# Patient Record
Sex: FEMALE | Race: WHITE | Employment: FULL TIME | URBAN - METROPOLITAN AREA
[De-identification: names, ages, dates, MRNs, and addresses within clinical notes are randomized per-mention and may not be internally consistent; named-entity substitution may affect disease eponyms.]

---

## 2022-11-09 ENCOUNTER — OFFICE VISIT (OUTPATIENT)
Dept: FAMILY MEDICINE CLINIC | Facility: CLINIC | Age: 27
End: 2022-11-09
Payer: COMMERCIAL

## 2022-11-09 ENCOUNTER — LAB ENCOUNTER (OUTPATIENT)
Dept: LAB | Age: 27
End: 2022-11-09
Attending: PHYSICIAN ASSISTANT
Payer: COMMERCIAL

## 2022-11-09 VITALS
DIASTOLIC BLOOD PRESSURE: 68 MMHG | WEIGHT: 127 LBS | SYSTOLIC BLOOD PRESSURE: 110 MMHG | BODY MASS INDEX: 19.93 KG/M2 | HEART RATE: 71 BPM | OXYGEN SATURATION: 98 % | RESPIRATION RATE: 16 BRPM | HEIGHT: 67 IN

## 2022-11-09 DIAGNOSIS — F41.9 ANXIETY AND DEPRESSION: ICD-10-CM

## 2022-11-09 DIAGNOSIS — L40.9 PSORIASIS: ICD-10-CM

## 2022-11-09 DIAGNOSIS — F32.A ANXIETY AND DEPRESSION: ICD-10-CM

## 2022-11-09 DIAGNOSIS — J45.30 MILD PERSISTENT ASTHMA WITHOUT COMPLICATION: ICD-10-CM

## 2022-11-09 DIAGNOSIS — K64.4 EXTERNAL HEMORRHOID: ICD-10-CM

## 2022-11-09 DIAGNOSIS — Z12.4 CERVICAL CANCER SCREENING: ICD-10-CM

## 2022-11-09 DIAGNOSIS — Z00.00 ROUTINE GENERAL MEDICAL EXAMINATION AT A HEALTH CARE FACILITY: ICD-10-CM

## 2022-11-09 DIAGNOSIS — Z00.00 ROUTINE GENERAL MEDICAL EXAMINATION AT A HEALTH CARE FACILITY: Primary | ICD-10-CM

## 2022-11-09 PROBLEM — Z97.5 IUD (INTRAUTERINE DEVICE) IN PLACE: Status: ACTIVE | Noted: 2022-11-09

## 2022-11-09 LAB
ALBUMIN SERPL-MCNC: 4.4 G/DL (ref 3.4–5)
ALBUMIN/GLOB SERPL: 1.3 {RATIO} (ref 1–2)
ALP LIVER SERPL-CCNC: 67 U/L
ALT SERPL-CCNC: 18 U/L
ANION GAP SERPL CALC-SCNC: 4 MMOL/L (ref 0–18)
AST SERPL-CCNC: 14 U/L (ref 15–37)
BASOPHILS # BLD AUTO: 0.06 X10(3) UL (ref 0–0.2)
BASOPHILS NFR BLD AUTO: 1.2 %
BILIRUB SERPL-MCNC: 0.5 MG/DL (ref 0.1–2)
BUN BLD-MCNC: 9 MG/DL (ref 7–18)
CALCIUM BLD-MCNC: 9.3 MG/DL (ref 8.5–10.1)
CHLORIDE SERPL-SCNC: 104 MMOL/L (ref 98–112)
CHOLEST SERPL-MCNC: 148 MG/DL (ref ?–200)
CO2 SERPL-SCNC: 29 MMOL/L (ref 21–32)
CREAT BLD-MCNC: 0.69 MG/DL
EOSINOPHIL # BLD AUTO: 0.06 X10(3) UL (ref 0–0.7)
EOSINOPHIL NFR BLD AUTO: 1.2 %
ERYTHROCYTE [DISTWIDTH] IN BLOOD BY AUTOMATED COUNT: 11.4 %
FASTING PATIENT LIPID ANSWER: NO
FASTING STATUS PATIENT QL REPORTED: NO
GFR SERPLBLD BASED ON 1.73 SQ M-ARVRAT: 122 ML/MIN/1.73M2 (ref 60–?)
GLOBULIN PLAS-MCNC: 3.3 G/DL (ref 2.8–4.4)
GLUCOSE BLD-MCNC: 84 MG/DL (ref 70–99)
HCT VFR BLD AUTO: 40.8 %
HDLC SERPL-MCNC: 57 MG/DL (ref 40–59)
HGB BLD-MCNC: 13.9 G/DL
IMM GRANULOCYTES # BLD AUTO: 0.01 X10(3) UL (ref 0–1)
IMM GRANULOCYTES NFR BLD: 0.2 %
LDLC SERPL CALC-MCNC: 73 MG/DL (ref ?–100)
LYMPHOCYTES # BLD AUTO: 1.85 X10(3) UL (ref 1–4)
LYMPHOCYTES NFR BLD AUTO: 38.3 %
MCH RBC QN AUTO: 31.9 PG (ref 26–34)
MCHC RBC AUTO-ENTMCNC: 34.1 G/DL (ref 31–37)
MCV RBC AUTO: 93.6 FL
MONOCYTES # BLD AUTO: 0.38 X10(3) UL (ref 0.1–1)
MONOCYTES NFR BLD AUTO: 7.9 %
NEUTROPHILS # BLD AUTO: 2.47 X10 (3) UL (ref 1.5–7.7)
NEUTROPHILS # BLD AUTO: 2.47 X10(3) UL (ref 1.5–7.7)
NEUTROPHILS NFR BLD AUTO: 51.2 %
NONHDLC SERPL-MCNC: 91 MG/DL (ref ?–130)
OSMOLALITY SERPL CALC.SUM OF ELEC: 282 MOSM/KG (ref 275–295)
PLATELET # BLD AUTO: 325 10(3)UL (ref 150–450)
POTASSIUM SERPL-SCNC: 4.2 MMOL/L (ref 3.5–5.1)
PROT SERPL-MCNC: 7.7 G/DL (ref 6.4–8.2)
RBC # BLD AUTO: 4.36 X10(6)UL
SODIUM SERPL-SCNC: 137 MMOL/L (ref 136–145)
TRIGL SERPL-MCNC: 101 MG/DL (ref 30–149)
TSI SER-ACNC: 1.65 MIU/ML (ref 0.36–3.74)
VIT B12 SERPL-MCNC: 438 PG/ML (ref 193–986)
VIT D+METAB SERPL-MCNC: 23.6 NG/ML (ref 30–100)
VLDLC SERPL CALC-MCNC: 15 MG/DL (ref 0–30)
WBC # BLD AUTO: 4.8 X10(3) UL (ref 4–11)

## 2022-11-09 PROCEDURE — 80053 COMPREHEN METABOLIC PANEL: CPT

## 2022-11-09 PROCEDURE — 3008F BODY MASS INDEX DOCD: CPT | Performed by: PHYSICIAN ASSISTANT

## 2022-11-09 PROCEDURE — G0438 PPPS, INITIAL VISIT: HCPCS | Performed by: PHYSICIAN ASSISTANT

## 2022-11-09 PROCEDURE — 82607 VITAMIN B-12: CPT

## 2022-11-09 PROCEDURE — 82306 VITAMIN D 25 HYDROXY: CPT

## 2022-11-09 PROCEDURE — 99385 PREV VISIT NEW AGE 18-39: CPT | Performed by: PHYSICIAN ASSISTANT

## 2022-11-09 PROCEDURE — 3078F DIAST BP <80 MM HG: CPT | Performed by: PHYSICIAN ASSISTANT

## 2022-11-09 PROCEDURE — 3074F SYST BP LT 130 MM HG: CPT | Performed by: PHYSICIAN ASSISTANT

## 2022-11-09 PROCEDURE — 80061 LIPID PANEL: CPT

## 2022-11-09 PROCEDURE — 85025 COMPLETE CBC W/AUTO DIFF WBC: CPT

## 2022-11-09 PROCEDURE — 84443 ASSAY THYROID STIM HORMONE: CPT

## 2022-11-09 PROCEDURE — 36415 COLL VENOUS BLD VENIPUNCTURE: CPT

## 2022-11-09 RX ORDER — FLUTICASONE PROPIONATE 44 UG/1
AEROSOL, METERED RESPIRATORY (INHALATION)
COMMUNITY
Start: 2022-09-06

## 2023-02-13 ENCOUNTER — OFFICE VISIT (OUTPATIENT)
Dept: FAMILY MEDICINE CLINIC | Facility: CLINIC | Age: 28
End: 2023-02-13
Payer: COMMERCIAL

## 2023-02-13 VITALS
SYSTOLIC BLOOD PRESSURE: 112 MMHG | HEIGHT: 67 IN | DIASTOLIC BLOOD PRESSURE: 80 MMHG | BODY MASS INDEX: 20.4 KG/M2 | RESPIRATION RATE: 16 BRPM | WEIGHT: 130 LBS

## 2023-02-13 DIAGNOSIS — M62.838 MUSCLE SPASM: Primary | ICD-10-CM

## 2023-02-13 RX ORDER — METHOCARBAMOL 750 MG/1
750 TABLET, FILM COATED ORAL 3 TIMES DAILY
Qty: 30 TABLET | Refills: 0 | Status: SHIPPED | OUTPATIENT
Start: 2023-02-13

## 2023-04-05 ENCOUNTER — TELEPHONE (OUTPATIENT)
Dept: FAMILY MEDICINE CLINIC | Facility: CLINIC | Age: 28
End: 2023-04-05

## 2023-04-05 DIAGNOSIS — M62.838 MUSCLE SPASM: Primary | ICD-10-CM

## 2023-04-05 NOTE — TELEPHONE ENCOUNTER
Dr. Marlena Smith note put I Dr. Hidalgo Foots office for review, ok for continuation of chiro therapy?

## 2023-04-26 ENCOUNTER — PATIENT MESSAGE (OUTPATIENT)
Dept: FAMILY MEDICINE CLINIC | Facility: CLINIC | Age: 28
End: 2023-04-26

## 2023-04-26 DIAGNOSIS — M62.838 MUSCLE SPASM: Primary | ICD-10-CM

## 2023-04-26 NOTE — TELEPHONE ENCOUNTER
From: Ravindra Naik  To: Zaida Ronquillo PA-C  Sent: 4/26/2023 6:33 AM CDT  Subject: Insurance Change    I was referred to a chiropractor, which has been so helpful for my shoulder. My insurance is changing though and wanted to send this information over. My updated insurance is through [employer], member ID: [id number] and group number: [id number] through [employer]. Feel free to call me at [phone]. Thank you! no

## 2023-06-27 ENCOUNTER — PATIENT MESSAGE (OUTPATIENT)
Dept: FAMILY MEDICINE CLINIC | Facility: CLINIC | Age: 28
End: 2023-06-27

## 2023-06-28 RX ORDER — FLUTICASONE PROPIONATE 44 UG/1
2 AEROSOL, METERED RESPIRATORY (INHALATION) 2 TIMES DAILY
Qty: 10.6 G | Refills: 2 | Status: SHIPPED | OUTPATIENT
Start: 2023-06-28

## 2023-09-07 RX ORDER — FLUTICASONE PROPIONATE 44 UG/1
2 AEROSOL, METERED RESPIRATORY (INHALATION) 2 TIMES DAILY
Qty: 3 EACH | Refills: 0 | Status: SHIPPED | OUTPATIENT
Start: 2023-09-07

## 2023-09-25 NOTE — TELEPHONE ENCOUNTER
A refill request was received for:  Requested Prescriptions     Pending Prescriptions Disp Refills    SERTRALINE 50 MG Oral Tab [Pharmacy Med Name: SERTRALINE 50MG TABLETS] 90 tablet 0     Sig: TAKE 1 TABLET(50 MG) BY MOUTH DAILY       Last refill date: 6/28/2023      Last office visit: 11/9/2022    Follow up due:  No future appointments.

## 2023-10-18 RX ORDER — FLUTICASONE PROPIONATE 44 UG/1
2 AEROSOL, METERED RESPIRATORY (INHALATION) 2 TIMES DAILY
Qty: 3 EACH | Refills: 0 | Status: SHIPPED | OUTPATIENT
Start: 2023-10-18

## 2023-11-02 RX ORDER — FLUTICASONE PROPIONATE 44 UG/1
2 AEROSOL, METERED RESPIRATORY (INHALATION) 2 TIMES DAILY
Qty: 10.6 G | Refills: 0 | Status: SHIPPED | OUTPATIENT
Start: 2023-11-02

## 2023-11-02 NOTE — TELEPHONE ENCOUNTER
A refill request was received for:  Requested Prescriptions     Pending Prescriptions Disp Refills    FLOVENT HFA 44 MCG/ACT Inhalation Aerosol [Pharmacy Med Name: [address] [place] [address] HFA 44MCG ORAL INH 120INH] 10.6 g 0     Sig: INHALE 2 PUFFS INTO THE LUNGS TWICE DAILY     My chart sent-due for annual  Last refill date: 10/18/2023      Last office visit: 11/9/2023    Follow up due:  No future appointments.

## 2023-12-04 ENCOUNTER — LAB ENCOUNTER (OUTPATIENT)
Dept: LAB | Age: 28
End: 2023-12-04
Attending: PHYSICIAN ASSISTANT
Payer: COMMERCIAL

## 2023-12-04 ENCOUNTER — OFFICE VISIT (OUTPATIENT)
Dept: FAMILY MEDICINE CLINIC | Facility: CLINIC | Age: 28
End: 2023-12-04
Payer: COMMERCIAL

## 2023-12-04 VITALS
SYSTOLIC BLOOD PRESSURE: 116 MMHG | HEIGHT: 67 IN | DIASTOLIC BLOOD PRESSURE: 78 MMHG | WEIGHT: 130 LBS | BODY MASS INDEX: 20.4 KG/M2 | HEART RATE: 65 BPM | OXYGEN SATURATION: 98 % | RESPIRATION RATE: 16 BRPM

## 2023-12-04 DIAGNOSIS — Z00.00 ROUTINE GENERAL MEDICAL EXAMINATION AT A HEALTH CARE FACILITY: ICD-10-CM

## 2023-12-04 DIAGNOSIS — Z00.00 ROUTINE GENERAL MEDICAL EXAMINATION AT A HEALTH CARE FACILITY: Primary | ICD-10-CM

## 2023-12-04 DIAGNOSIS — E55.9 VITAMIN D DEFICIENCY: ICD-10-CM

## 2023-12-04 DIAGNOSIS — M62.838 MUSCLE SPASM: ICD-10-CM

## 2023-12-04 DIAGNOSIS — F32.A ANXIETY AND DEPRESSION: ICD-10-CM

## 2023-12-04 DIAGNOSIS — Z30.431 IUD SURVEILLANCE: ICD-10-CM

## 2023-12-04 DIAGNOSIS — F41.9 ANXIETY AND DEPRESSION: ICD-10-CM

## 2023-12-04 DIAGNOSIS — J45.30 MILD PERSISTENT ASTHMA WITHOUT COMPLICATION: ICD-10-CM

## 2023-12-04 LAB
ALBUMIN SERPL-MCNC: 4.3 G/DL (ref 3.4–5)
ALBUMIN/GLOB SERPL: 1.2 {RATIO} (ref 1–2)
ALP LIVER SERPL-CCNC: 66 U/L
ALT SERPL-CCNC: 21 U/L
ANION GAP SERPL CALC-SCNC: 6 MMOL/L (ref 0–18)
AST SERPL-CCNC: 15 U/L (ref 15–37)
BASOPHILS # BLD AUTO: 0.03 X10(3) UL (ref 0–0.2)
BASOPHILS NFR BLD AUTO: 0.6 %
BILIRUB SERPL-MCNC: 0.5 MG/DL (ref 0.1–2)
BUN BLD-MCNC: 10 MG/DL (ref 9–23)
CALCIUM BLD-MCNC: 9.2 MG/DL (ref 8.5–10.1)
CHLORIDE SERPL-SCNC: 107 MMOL/L (ref 98–112)
CHOLEST SERPL-MCNC: 127 MG/DL (ref ?–200)
CO2 SERPL-SCNC: 27 MMOL/L (ref 21–32)
CREAT BLD-MCNC: 0.78 MG/DL
EGFRCR SERPLBLD CKD-EPI 2021: 106 ML/MIN/1.73M2 (ref 60–?)
EOSINOPHIL # BLD AUTO: 0.06 X10(3) UL (ref 0–0.7)
EOSINOPHIL NFR BLD AUTO: 1.1 %
ERYTHROCYTE [DISTWIDTH] IN BLOOD BY AUTOMATED COUNT: 11.7 %
FASTING PATIENT LIPID ANSWER: NO
FASTING STATUS PATIENT QL REPORTED: NO
GLOBULIN PLAS-MCNC: 3.6 G/DL (ref 2.8–4.4)
GLUCOSE BLD-MCNC: 94 MG/DL (ref 70–99)
HCT VFR BLD AUTO: 40.6 %
HDLC SERPL-MCNC: 54 MG/DL (ref 40–59)
HGB BLD-MCNC: 13.6 G/DL
IMM GRANULOCYTES # BLD AUTO: 0.02 X10(3) UL (ref 0–1)
IMM GRANULOCYTES NFR BLD: 0.4 %
LDLC SERPL CALC-MCNC: 58 MG/DL (ref ?–100)
LYMPHOCYTES # BLD AUTO: 1.51 X10(3) UL (ref 1–4)
LYMPHOCYTES NFR BLD AUTO: 28.5 %
MCH RBC QN AUTO: 31.3 PG (ref 26–34)
MCHC RBC AUTO-ENTMCNC: 33.5 G/DL (ref 31–37)
MCV RBC AUTO: 93.5 FL
MONOCYTES # BLD AUTO: 0.48 X10(3) UL (ref 0.1–1)
MONOCYTES NFR BLD AUTO: 9.1 %
NEUTROPHILS # BLD AUTO: 3.2 X10 (3) UL (ref 1.5–7.7)
NEUTROPHILS # BLD AUTO: 3.2 X10(3) UL (ref 1.5–7.7)
NEUTROPHILS NFR BLD AUTO: 60.3 %
NONHDLC SERPL-MCNC: 73 MG/DL (ref ?–130)
OSMOLALITY SERPL CALC.SUM OF ELEC: 289 MOSM/KG (ref 275–295)
PLATELET # BLD AUTO: 315 10(3)UL (ref 150–450)
POTASSIUM SERPL-SCNC: 4 MMOL/L (ref 3.5–5.1)
PROT SERPL-MCNC: 7.9 G/DL (ref 6.4–8.2)
RBC # BLD AUTO: 4.34 X10(6)UL
SODIUM SERPL-SCNC: 140 MMOL/L (ref 136–145)
TRIGL SERPL-MCNC: 77 MG/DL (ref 30–149)
TSI SER-ACNC: 1.44 MIU/ML (ref 0.36–3.74)
VIT D+METAB SERPL-MCNC: 37 NG/ML (ref 30–100)
VLDLC SERPL CALC-MCNC: 11 MG/DL (ref 0–30)
WBC # BLD AUTO: 5.3 X10(3) UL (ref 4–11)

## 2023-12-04 PROCEDURE — 82306 VITAMIN D 25 HYDROXY: CPT

## 2023-12-04 PROCEDURE — 3008F BODY MASS INDEX DOCD: CPT | Performed by: PHYSICIAN ASSISTANT

## 2023-12-04 PROCEDURE — 36415 COLL VENOUS BLD VENIPUNCTURE: CPT

## 2023-12-04 PROCEDURE — 3074F SYST BP LT 130 MM HG: CPT | Performed by: PHYSICIAN ASSISTANT

## 2023-12-04 PROCEDURE — 3078F DIAST BP <80 MM HG: CPT | Performed by: PHYSICIAN ASSISTANT

## 2023-12-04 PROCEDURE — G0438 PPPS, INITIAL VISIT: HCPCS | Performed by: PHYSICIAN ASSISTANT

## 2023-12-04 PROCEDURE — 99395 PREV VISIT EST AGE 18-39: CPT | Performed by: PHYSICIAN ASSISTANT

## 2023-12-04 PROCEDURE — 80061 LIPID PANEL: CPT

## 2023-12-04 PROCEDURE — 80053 COMPREHEN METABOLIC PANEL: CPT

## 2023-12-04 PROCEDURE — 85025 COMPLETE CBC W/AUTO DIFF WBC: CPT

## 2023-12-04 PROCEDURE — 84443 ASSAY THYROID STIM HORMONE: CPT

## 2023-12-04 RX ORDER — ALBUTEROL SULFATE 90 UG/1
2 AEROSOL, METERED RESPIRATORY (INHALATION) EVERY 6 HOURS PRN
Qty: 1 EACH | Refills: 5 | Status: SHIPPED | OUTPATIENT
Start: 2023-12-04

## 2023-12-04 RX ORDER — FLUTICASONE PROPIONATE 44 UG/1
2 AEROSOL, METERED RESPIRATORY (INHALATION) 2 TIMES DAILY
Qty: 3 EACH | Refills: 3 | Status: SHIPPED | OUTPATIENT
Start: 2023-12-04

## 2023-12-30 DIAGNOSIS — F41.9 ANXIETY AND DEPRESSION: ICD-10-CM

## 2023-12-30 DIAGNOSIS — F32.A ANXIETY AND DEPRESSION: ICD-10-CM

## 2024-01-02 NOTE — TELEPHONE ENCOUNTER
A refill request was received for:  Requested Prescriptions     Pending Prescriptions Disp Refills    SERTRALINE 50 MG Oral Tab [Pharmacy Med Name: SERTRALINE 50MG TABLETS] 90 tablet 3     Sig: TAKE 1 TABLET(50 MG) BY MOUTH DAILY       Last refill date:12/4/2023       Last office visit: 12/4/2023    Follow up due:  Future Appointments   Date Time Provider Department Center   1/12/2024 10:30 AM [id number]

## 2024-01-12 ENCOUNTER — PATIENT MESSAGE (OUTPATIENT)
Dept: FAMILY MEDICINE CLINIC | Facility: CLINIC | Age: 29
End: 2024-01-12

## 2024-01-12 NOTE — TELEPHONE ENCOUNTER
From: Shea Oro  To: Cinda Velásquez  Sent: 1/12/2024 9:16 AM CST  Subject: IUD removal    I made an appointment with Dr Vincent at [facility] this morning through my chart for an iud removal on 1/16 and wanted to ensure that it is covered by insurance. Thank you!

## 2024-01-16 DIAGNOSIS — J45.30 MILD PERSISTENT ASTHMA WITHOUT COMPLICATION: ICD-10-CM

## 2024-01-17 RX ORDER — FLUTICASONE PROPIONATE 44 UG/1
2 AEROSOL, METERED RESPIRATORY (INHALATION) 2 TIMES DAILY
Qty: 3 EACH | Refills: 3 | Status: SHIPPED | OUTPATIENT
Start: 2024-01-17 | End: 2024-01-17

## 2024-01-26 ENCOUNTER — OFFICE VISIT (OUTPATIENT)
Dept: OBGYN CLINIC | Facility: CLINIC | Age: 29
End: 2024-01-26
Payer: COMMERCIAL

## 2024-01-26 VITALS
HEART RATE: 61 BPM | DIASTOLIC BLOOD PRESSURE: 84 MMHG | HEIGHT: 65 IN | BODY MASS INDEX: 21.31 KG/M2 | SYSTOLIC BLOOD PRESSURE: 126 MMHG | WEIGHT: 127.88 LBS

## 2024-01-26 DIAGNOSIS — Z53.8 ATTEMPTED IUD REMOVAL, UNSUCCESSFUL: Primary | ICD-10-CM

## 2024-01-26 DIAGNOSIS — Z97.5 ATTEMPTED IUD REMOVAL, UNSUCCESSFUL: Primary | ICD-10-CM

## 2024-01-26 PROCEDURE — 3074F SYST BP LT 130 MM HG: CPT

## 2024-01-26 PROCEDURE — 99212 OFFICE O/P EST SF 10 MIN: CPT

## 2024-01-26 PROCEDURE — 3008F BODY MASS INDEX DOCD: CPT

## 2024-01-26 PROCEDURE — 3079F DIAST BP 80-89 MM HG: CPT

## 2024-01-26 NOTE — PROGRESS NOTES
Shea Oro is a 28 year old female  No LMP recorded (lmp unknown). (Menstrual status: IUD - Intrauterine Device).   Chief Complaint   Patient presents with    IUD     IUD removal     New Patient   .  She is planning to conceive and would like the Mirena removed.     OBSTETRICS HISTORY:  OB History    Para Term  AB Living   0 0 0 0 0 0   SAB IAB Ectopic Multiple Live Births   0 0 0 0 0       GYNE HISTORY:  Periods  none due to IUD      History   Sexual Activity    Sexual activity: Not on file        Hx Prior Abnormal Pap: No  Pap Date: 22  Pap Result Notes: negative        MEDICAL HISTORY:      SURGICAL HISTORY:  History reviewed. No pertinent surgical history.    SOCIAL HISTORY:  Social History     Socioeconomic History    Marital status:      Spouse name: Not on file    Number of children: Not on file    Years of education: Not on file    Highest education level: Not on file   Occupational History    Not on file   Tobacco Use    Smoking status: Never    Smokeless tobacco: Never   Vaping Use    Vaping Use: Never used   Substance and Sexual Activity    Alcohol use: Yes     Comment: 1 every 2 weeks    Drug use: Never    Sexual activity: Not on file   Other Topics Concern    Not on file   Social History Narrative    Not on file     Social Determinants of Health     Financial Resource Strain: Not on file   Food Insecurity: Not on file   Transportation Needs: Not on file   Physical Activity: Not on file   Stress: Not on file   Social Connections: Not on file   Housing Stability: Not on file       FAMILY HISTORY:  History reviewed. No pertinent family history.    MEDICATIONS:    Current Outpatient Medications:     Beclomethasone Diprop HFA (QVAR REDIHALER) 40 MCG/ACT Inhalation Aerosol, Breath Activated, Inhale 1-2 puffs into the lungs in the morning and 1-2 puffs before bedtime., Disp: 3 each, Rfl: 3    sertraline 50 MG Oral Tab, Take 1 tablet (50 mg total) by mouth daily., Disp:  90 tablet, Rfl: 3    albuterol 108 (90 Base) MCG/ACT Inhalation Aero Soln, Inhale 2 puffs into the lungs every 6 (six) hours as needed for Wheezing., Disp: 1 each, Rfl: 5    ALLERGIES:  No Known Allergies      PHYSICAL EXAM:   Pelvic Exam:  External Genitalia: normal appearance, hair distribution, and no lesions  Urethral Meatus:  normal in size, location, without lesions and prolapse  Bladder:  No fullness, masses or tenderness  Vagina:  Normal appearance without lesions, no abnormal discharge  Cervix:  Normal without tenderness on motion, IUD string seen, very short, 1 mm from cervix   Uterus: normal in size, contour, position, mobility, without tenderness  Adnexa: normal without masses or tenderness  Perineum: normal  Anus: no hemorroids     Assessment & Plan:  1. Attempted IUD removal, unsuccessful  -tried to grasp strings with forceps, unable to due to IUD strings being very short.  -Advise her to follow up with OBs.

## 2024-02-15 ENCOUNTER — HOSPITAL ENCOUNTER (OUTPATIENT)
Dept: ULTRASOUND IMAGING | Facility: HOSPITAL | Age: 29
Discharge: HOME | End: 2024-02-15
Attending: STUDENT IN AN ORGANIZED HEALTH CARE EDUCATION/TRAINING PROGRAM
Payer: COMMERCIAL

## 2024-02-15 ENCOUNTER — OFFICE VISIT (OUTPATIENT)
Dept: OBGYN CLINIC | Facility: CLINIC | Age: 29
End: 2024-02-15
Payer: COMMERCIAL

## 2024-02-15 VITALS
DIASTOLIC BLOOD PRESSURE: 93 MMHG | BODY MASS INDEX: 21.31 KG/M2 | WEIGHT: 127.88 LBS | HEIGHT: 65 IN | HEART RATE: 72 BPM | SYSTOLIC BLOOD PRESSURE: 153 MMHG

## 2024-02-15 DIAGNOSIS — T83.32XA INTRAUTERINE CONTRACEPTIVE DEVICE THREADS LOST, INITIAL ENCOUNTER: ICD-10-CM

## 2024-02-15 DIAGNOSIS — R03.0 ELEVATED BLOOD PRESSURE READING: ICD-10-CM

## 2024-02-15 DIAGNOSIS — T83.32XA INTRAUTERINE CONTRACEPTIVE DEVICE THREADS LOST, INITIAL ENCOUNTER: Primary | ICD-10-CM

## 2024-02-15 PROCEDURE — 3077F SYST BP >= 140 MM HG: CPT | Performed by: STUDENT IN AN ORGANIZED HEALTH CARE EDUCATION/TRAINING PROGRAM

## 2024-02-15 PROCEDURE — 76856 US EXAM PELVIC COMPLETE: CPT | Performed by: STUDENT IN AN ORGANIZED HEALTH CARE EDUCATION/TRAINING PROGRAM

## 2024-02-15 PROCEDURE — 99213 OFFICE O/P EST LOW 20 MIN: CPT | Performed by: STUDENT IN AN ORGANIZED HEALTH CARE EDUCATION/TRAINING PROGRAM

## 2024-02-15 PROCEDURE — 93975 VASCULAR STUDY: CPT | Performed by: STUDENT IN AN ORGANIZED HEALTH CARE EDUCATION/TRAINING PROGRAM

## 2024-02-15 PROCEDURE — 76830 TRANSVAGINAL US NON-OB: CPT | Performed by: STUDENT IN AN ORGANIZED HEALTH CARE EDUCATION/TRAINING PROGRAM

## 2024-02-15 PROCEDURE — 3008F BODY MASS INDEX DOCD: CPT | Performed by: STUDENT IN AN ORGANIZED HEALTH CARE EDUCATION/TRAINING PROGRAM

## 2024-02-15 PROCEDURE — 3080F DIAST BP >= 90 MM HG: CPT | Performed by: STUDENT IN AN ORGANIZED HEALTH CARE EDUCATION/TRAINING PROGRAM

## 2024-02-15 NOTE — PROGRESS NOTES
[facility]  Obstetrics and Gynecology   History & Physical      Chief complaint:   Chief Complaint   Patient presents with    Procedure    IUD     Remove IUD     Consult     Discuss preconception        Subjective:     HPI: Shea Oro is a 28 year old  with No LMP recorded (lmp unknown). (Menstrual status: IUD - Intrauterine Device). presenting for IUD removal.  Uses the following pronouns: she/her.  Her PCP is Rambo Cordova MD.  Partner present for support.    Desires to get pregnant in the next year.  Would like IUD out however unable to reach strings at last visit (\"1 mm from the cervix\").  Reports that they were cut short and no one had been able to see or feel strings since she got it placed.  Ultrasound was needed at that time (7 years ago) to confirm IUD was still there.    OB History  OB History    Para Term  AB Living   0 0 0 0 0 0   SAB IAB Ectopic Multiple Live Births   0 0 0 0 0     OB History    Para Term  AB Living   0 0 0 0 0 0   SAB IAB Ectopic Multiple Live Births   0 0 0 0 0     ROS neg unless otherwise stated in HPI    Depression Scale      PHQ-2 SCORE: 0  , done 2023         Meds:  Current Outpatient Medications on File Prior to Visit   Medication Sig Dispense Refill    Beclomethasone Diprop HFA (QVAR REDIHALER) 40 MCG/ACT Inhalation Aerosol, Breath Activated Inhale 1-2 puffs into the lungs in the morning and 1-2 puffs before bedtime. 3 each 3    sertraline 50 MG Oral Tab Take 1 tablet (50 mg total) by mouth daily. 90 tablet 3    albuterol 108 (90 Base) MCG/ACT Inhalation Aero Soln Inhale 2 puffs into the lungs every 6 (six) hours as needed for Wheezing. 1 each 5     No current facility-administered medications on file prior to visit.       All:  No Known Allergies    PMH:  No past medical history on file.    PSH:  No past surgical history on file.    Social History:  Social History     Socioeconomic History    Marital status:     Tobacco Use    Smoking status: Never    Smokeless tobacco: Never   Vaping Use    Vaping Use: Never used   Substance and Sexual Activity    Alcohol use: Yes     Comment: 1 every 2 weeks    Drug use: Never        Family History:  No family history on file.    Immunization History:  Immunization History   Administered Date(s) Administered    Covid-19 Vaccine Pfizer Bivalent 30mcg/0.3mL 11/03/2022    FLUZONE 6 months and older PFS 0.5 ml (44711) 11/03/2022    Influenza 02/01/2017    Meningococcal-Menactra 07/26/2013    TDAP 06/07/2019    Tb Intradermal Test 10/12/2021, 12/10/2021         Objective:     Vitals:    02/15/24 0940   BP: (!) 153/93   Pulse: 72   Weight: 127 lb 13.9 oz (58 kg)   Height: 65\"       Body mass index is 21.28 kg/m².    Physical Exam:     General: normal appearance  HEENT: normocephalic  Respiratory: normal work of breathing, no extra use of accessory muscles  Cardiac: normal rate  Abdominal: Nontender to palpation, no masses palpated,   MSK: normal range of motion  Neuro: normal movement, normal sensory  Skin: no abnormalities seen    :  - normal appearing external genitalia  - normal appearing vagina, well estrogenized, physiologic discharge  - no strings seen at the cervix  Procedures done: Speculum placed.  Betadine x3.  Strings not seen.  Unable to get strings for IUD removal despite using cytobrush and long kellies after dilating cervix.      Labs:  Lab Results   Component Value Date    WBC 5.3 12/04/2023    RBC 4.34 12/04/2023    HGB 13.6 12/04/2023    HCT 40.6 12/04/2023    MCV 93.5 12/04/2023    MCH 31.3 12/04/2023    MCHC 33.5 12/04/2023    RDW 11.7 12/04/2023    .0 12/04/2023        Lab Results   Component Value Date    GLU 94 12/04/2023    BUN 10 12/04/2023    CREATSERUM 0.78 12/04/2023    ANIONGAP 6 12/04/2023    CA 9.2 12/04/2023    OSMOCALC 289 12/04/2023    ALKPHO 66 12/04/2023    AST 15 12/04/2023    ALT 21 12/04/2023    BILT 0.5 12/04/2023    TP 7.9 12/04/2023    ALB  4.3 2023    GLOBULIN 3.6 2023     2023    K 4.0 2023     2023    CO2 27.0 2023       Lab Results   Component Value Date    CHOLEST 127 2023    TRIG 77 2023    HDL 54 2023    LDL 58 2023    VLDL 11 2023    NONHDLC 73 2023        Lab Results   Component Value Date    TSH 1.440 2023        No results found for: \"EAG\", \"A1C\"      Imaging:  No results found.     Assessment:     Shea Oro is a 28 year old  female presenting for IUD management    #Missing IUD strings: unable to see or reach strings for removal today.  Strings seen 1 mm from cervix by other provider last visit, however, unable to remove.  Strings had been undetected since placement 7 years ago when strings were cut short and ultrasound needed to be done to confirm IUD was in place.  [ ] TVUS stat  [ ] if IUD visualized on TVUS, plan office hysteroscopy    #Elevated BP: no other elevated BPs in previous visits.  Confirmatory BP not done.  Continue to monitor at subsequent office visits.    Nena Salas MD   [facility] - OBGYN    Note to patient and family:  The 21st Century Cures Act makes medical notes available to patients in the interest of transparency.  However, please be advised that this is a medical document.  It is intended as a peer to peer communication.  It is written in medical language and may contain abbreviations or verbiage that are technical and unfamiliar.  It may appear blunt or direct.  Medical documents are intended to carry relevant information, facts as evident, and the clinical opinion of the practitioner.

## 2024-02-19 ENCOUNTER — TELEPHONE (OUTPATIENT)
Facility: CLINIC | Age: 29
End: 2024-02-19

## 2024-02-19 RX ORDER — MISOPROSTOL 100 UG/1
TABLET ORAL
Qty: 2 TABLET | Refills: 0 | OUTPATIENT
Start: 2024-02-19

## 2024-02-19 NOTE — TELEPHONE ENCOUNTER
Left VM for pt to schedule proceduer. 03/14/2024 at 1;30 pm.  Could be any other day (when RN is here and preferably first thing in the am).

## 2024-03-19 RX ORDER — MISOPROSTOL 100 UG/1
TABLET ORAL
Qty: 2 TABLET | Refills: 0 | Status: SHIPPED | OUTPATIENT
Start: 2024-03-19

## 2024-03-19 NOTE — TELEPHONE ENCOUNTER
Pt never picked original rx up at the pharmacy due to procedure date changed. Will route for review.

## 2024-04-02 ENCOUNTER — OFFICE VISIT (OUTPATIENT)
Facility: CLINIC | Age: 29
End: 2024-04-02
Payer: COMMERCIAL

## 2024-04-02 VITALS
WEIGHT: 127.81 LBS | DIASTOLIC BLOOD PRESSURE: 76 MMHG | SYSTOLIC BLOOD PRESSURE: 112 MMHG | BODY MASS INDEX: 21.29 KG/M2 | HEART RATE: 80 BPM | HEIGHT: 65 IN

## 2024-04-02 DIAGNOSIS — T83.32XD INTRAUTERINE CONTRACEPTIVE DEVICE THREADS LOST, SUBSEQUENT ENCOUNTER: Primary | ICD-10-CM

## 2024-04-02 LAB
CONTROL LINE PRESENT WITH A CLEAR BACKGROUND (YES/NO): YES YES/NO
KIT LOT #: NORMAL NUMERIC
PREGNANCY TEST, URINE: NEGATIVE

## 2024-04-02 PROCEDURE — 3074F SYST BP LT 130 MM HG: CPT | Performed by: STUDENT IN AN ORGANIZED HEALTH CARE EDUCATION/TRAINING PROGRAM

## 2024-04-02 PROCEDURE — 3008F BODY MASS INDEX DOCD: CPT | Performed by: STUDENT IN AN ORGANIZED HEALTH CARE EDUCATION/TRAINING PROGRAM

## 2024-04-02 PROCEDURE — 3078F DIAST BP <80 MM HG: CPT | Performed by: STUDENT IN AN ORGANIZED HEALTH CARE EDUCATION/TRAINING PROGRAM

## 2024-04-02 PROCEDURE — 96372 THER/PROPH/DIAG INJ SC/IM: CPT | Performed by: STUDENT IN AN ORGANIZED HEALTH CARE EDUCATION/TRAINING PROGRAM

## 2024-04-02 PROCEDURE — 81025 URINE PREGNANCY TEST: CPT | Performed by: STUDENT IN AN ORGANIZED HEALTH CARE EDUCATION/TRAINING PROGRAM

## 2024-04-02 RX ORDER — KETOROLAC TROMETHAMINE 30 MG/ML
60 INJECTION, SOLUTION INTRAMUSCULAR; INTRAVENOUS ONCE
Status: COMPLETED | OUTPATIENT
Start: 2024-04-02 | End: 2024-04-02

## 2024-04-02 RX ADMIN — KETOROLAC TROMETHAMINE 60 MG: 30 INJECTION, SOLUTION INTRAMUSCULAR; INTRAVENOUS at 09:08:00

## 2024-04-02 NOTE — PROCEDURES
Operative Report:     Shea Oro  : 1995     Date of procedure: 24    INDICATIONS:   28 year old  female with lost IUD strings presenting for IUD removal with hysteroscopy after failed in-office removal attempt.     PRE-OP DIAGNOSIS:   1) Lost IUD strings       POST-OP DIAGNOSIS:   same    PROCEDURE(S):   Hysteroscopy  IUD removal     ANESTHESIA:   Toradol  paracervical block     SURGEON(S): Nena Salas MD     ESTIMATED BLOOD LOSS: minimal            UTERINE DISTENSION MEDIUM: Normal Saline 0.9%  DEFICIT: 100 mL     SPECIMENS: Endometrial curettings             COMPLICATIONS:  None apparent     FINDINGS: Normal external genitalia, no lesions. Normal cervix, no lesions. Anteverted uterus.  IUD embedded in the fundus.  Endometrial cavity normal in shape. Endometrium follicular phase. Bilateral tubal ostia seen.     PROCEDURE: This procedure was fully reviewed with the patient and written informed consent was obtained after discussing risks, benefits, indication and alternatives. All questions were answered.     She was given toradol prior to the procedure.     She was placed in dorsal lithotomy position. She was prepped and draped in normal sterile fashion. A sterile bivalved speculum was placed in the vagina. A single tooth tenaculum was used to grasp the anterior lip of the cervix. The cervix was gently dilated with hegar dilators to 5 mm.     The hysteroscopic system was calibrated. The hysteroscope was gently advanced into the endometrial cavity. The uterine cavity was visualized and the previous findings noted. IUD strings were seen, grasped with forceps and with gentle teasing the embedded IUD was removed with the hysteroscope.    The hysteroscope was then removed from the uterus. The single tooth tenaculum was removed and good hemostasis was noted. Patient tolerated procedure well.     Nena Salas MD   [facility] - OBGYN

## 2024-06-10 ENCOUNTER — PATIENT MESSAGE (OUTPATIENT)
Facility: CLINIC | Age: 29
End: 2024-06-10

## 2024-06-10 DIAGNOSIS — O26.851 SPOTTING AFFECTING PREGNANCY IN FIRST TRIMESTER (HCC): Primary | ICD-10-CM

## 2024-06-11 ENCOUNTER — LAB ENCOUNTER (OUTPATIENT)
Dept: LAB | Age: 29
End: 2024-06-11
Payer: COMMERCIAL

## 2024-06-11 DIAGNOSIS — O26.851 SPOTTING AFFECTING PREGNANCY IN FIRST TRIMESTER (HCC): ICD-10-CM

## 2024-06-11 LAB
ANTIBODY SCREEN: NEGATIVE
B-HCG SERPL-ACNC: ABNORMAL MIU/ML
PROGEST SERPL-MCNC: 15.7 NG/ML
RH BLOOD TYPE: POSITIVE

## 2024-06-11 PROCEDURE — 86900 BLOOD TYPING SEROLOGIC ABO: CPT

## 2024-06-11 PROCEDURE — 84144 ASSAY OF PROGESTERONE: CPT

## 2024-06-11 PROCEDURE — 36415 COLL VENOUS BLD VENIPUNCTURE: CPT

## 2024-06-11 PROCEDURE — 86850 RBC ANTIBODY SCREEN: CPT

## 2024-06-11 PROCEDURE — 84702 CHORIONIC GONADOTROPIN TEST: CPT

## 2024-06-11 PROCEDURE — 86901 BLOOD TYPING SEROLOGIC RH(D): CPT

## 2024-06-11 NOTE — TELEPHONE ENCOUNTER
Spoke with pt. LMP- 4/25/24 6 weeks 5 days g- 1 p- 0 FOB- 6/20/24.    Having some dark brown spotting & mild cramping. Aware will get an HCG & progesterone and repeat HCG in 48 hours. To call if bleeding or cramping worsens. Will call once orders have been placed. Verbalized understanding. Orders pended.

## 2024-06-11 NOTE — TELEPHONE ENCOUNTER
From: Shea Oro  To: Nena Salas  Sent: 6/10/2024 3:46 PM CDT  Subject: Spotting     Hi, I have been spotting for over a day now with dark blood, a little over 6 weeks pregnant. There are also cramps however those have been present on and off for a couple weeks now. Is there anything I need to be on the lookout for or any concerns requiring me to come in? Thanks

## 2024-06-13 ENCOUNTER — LAB ENCOUNTER (OUTPATIENT)
Dept: LAB | Facility: HOSPITAL | Age: 29
End: 2024-06-13
Payer: COMMERCIAL

## 2024-06-13 DIAGNOSIS — O26.851 SPOTTING AFFECTING PREGNANCY IN FIRST TRIMESTER (HCC): ICD-10-CM

## 2024-06-13 LAB — B-HCG SERPL-ACNC: ABNORMAL MIU/ML

## 2024-06-13 PROCEDURE — 36415 COLL VENOUS BLD VENIPUNCTURE: CPT

## 2024-06-13 PROCEDURE — 84702 CHORIONIC GONADOTROPIN TEST: CPT

## 2024-06-14 DIAGNOSIS — R79.89 LOW SERUM PROGESTERONE: Primary | ICD-10-CM

## 2024-06-14 DIAGNOSIS — O36.80X0 PREGNANCY OF UNKNOWN ANATOMIC LOCATION (HCC): ICD-10-CM

## 2024-06-14 RX ORDER — PROGESTERONE 200 MG/1
200 CAPSULE ORAL NIGHTLY
Qty: 30 CAPSULE | Refills: 1 | Status: SHIPPED | OUTPATIENT
Start: 2024-06-14 | End: 2024-08-13

## 2024-06-14 NOTE — PROGRESS NOTES
Pls verify when would you like pt to have US. She has one scheduled on 6/20/24. Earliest avail is 6/19/24.     Patient aware of AGATHA Estrella's recommendation:  Her HCG is rising, but not as expected. Progesterone is at the lower end of normal. Prometrium sent to pharmacy and will need US at clinic to make sure pregnancy is in the uterus and not an ectopic. Will call pt if she needs to schedule her US earlier.     Patient verbalized understanding, agreed to and intend to comply with plan of care.

## 2024-06-17 NOTE — PROGRESS NOTES
Left message to call office back to let her know AGATHA Estrella recommendation:    Can keep 6/20. If she has any pelvic pain or vaginal bleeding, she will need to go to ED.

## 2024-06-20 ENCOUNTER — INITIAL PRENATAL (OUTPATIENT)
Dept: OBGYN CLINIC | Facility: CLINIC | Age: 29
End: 2024-06-20

## 2024-06-20 ENCOUNTER — ULTRASOUND ENCOUNTER (OUTPATIENT)
Facility: CLINIC | Age: 29
End: 2024-06-20

## 2024-06-20 VITALS
HEIGHT: 67 IN | WEIGHT: 125.69 LBS | DIASTOLIC BLOOD PRESSURE: 81 MMHG | BODY MASS INDEX: 19.73 KG/M2 | HEART RATE: 66 BPM | SYSTOLIC BLOOD PRESSURE: 119 MMHG

## 2024-06-20 DIAGNOSIS — O36.80X0 PREGNANCY OF UNKNOWN ANATOMIC LOCATION (HCC): ICD-10-CM

## 2024-06-20 DIAGNOSIS — Z34.90 PRENATAL CARE, ANTEPARTUM (HCC): ICD-10-CM

## 2024-06-20 DIAGNOSIS — Z34.01 ENCOUNTER FOR SUPERVISION OF NORMAL FIRST PREGNANCY IN FIRST TRIMESTER (HCC): Primary | ICD-10-CM

## 2024-06-20 DIAGNOSIS — Z11.3 SCREEN FOR STD (SEXUALLY TRANSMITTED DISEASE): ICD-10-CM

## 2024-06-20 LAB
APPEARANCE: CLEAR
BILIRUBIN: NEGATIVE
GLUCOSE (URINE DIPSTICK): NEGATIVE MG/DL
KETONES (URINE DIPSTICK): NEGATIVE MG/DL
MULTISTIX LOT#: ABNORMAL NUMERIC
NITRITE, URINE: NEGATIVE
OCCULT BLOOD: NEGATIVE
PH, URINE: 6 (ref 4.5–8)
PROTEIN (URINE DIPSTICK): NEGATIVE MG/DL
SPECIFIC GRAVITY: 1.01 (ref 1–1.03)
URINE-COLOR: YELLOW
UROBILINOGEN,SEMI-QN: 0.2 MG/DL (ref 0–1.9)

## 2024-06-20 PROCEDURE — 3008F BODY MASS INDEX DOCD: CPT | Performed by: STUDENT IN AN ORGANIZED HEALTH CARE EDUCATION/TRAINING PROGRAM

## 2024-06-20 PROCEDURE — 3079F DIAST BP 80-89 MM HG: CPT | Performed by: STUDENT IN AN ORGANIZED HEALTH CARE EDUCATION/TRAINING PROGRAM

## 2024-06-20 PROCEDURE — 76801 OB US < 14 WKS SINGLE FETUS: CPT | Performed by: STUDENT IN AN ORGANIZED HEALTH CARE EDUCATION/TRAINING PROGRAM

## 2024-06-20 PROCEDURE — 3074F SYST BP LT 130 MM HG: CPT | Performed by: STUDENT IN AN ORGANIZED HEALTH CARE EDUCATION/TRAINING PROGRAM

## 2024-06-20 PROCEDURE — 87491 CHLMYD TRACH DNA AMP PROBE: CPT | Performed by: STUDENT IN AN ORGANIZED HEALTH CARE EDUCATION/TRAINING PROGRAM

## 2024-06-20 PROCEDURE — 87591 N.GONORRHOEAE DNA AMP PROB: CPT | Performed by: STUDENT IN AN ORGANIZED HEALTH CARE EDUCATION/TRAINING PROGRAM

## 2024-06-20 PROCEDURE — 81002 URINALYSIS NONAUTO W/O SCOPE: CPT | Performed by: STUDENT IN AN ORGANIZED HEALTH CARE EDUCATION/TRAINING PROGRAM

## 2024-06-20 NOTE — PROGRESS NOTES
[facility]  Obstetrics and Gynecology  History & Physical    CC: Establish prenatal care     Subjective:     HPI:  Shea Oro is a 29 year old  female at 8w0d who presents today to establish prenatal care.       Estimated due date is determined by: LMP c/w US     Preconception planning  - Pregnancy is planned  - Patient was trying to conceive for - first time!  - Previous birth control: IUD  - Had been taking prenatal vitamin/folic acid prior to pregnancy: yes    Today  Patient denies VB currently  VB x2 days - still doing vaginal progesterone  +nausea  No vomiting  +constipated   Overall has gotten better  But can't eat vegetables.   Reports her mood is good      ROS   Negative unless otherwise stated    OB history    OB History    Para Term  AB Living   1 0 0 0 0 0   SAB IAB Ectopic Multiple Live Births   0 0 0 0 0      # Outcome Date GA Lbr Benjy/2nd Weight Sex Type Anes PTL Lv   1 Current                Medical history    No past medical history on file.    Surgical history    History reviewed. No pertinent surgical history.     MEDS:    Current Outpatient Medications on File Prior to Visit   Medication Sig Dispense Refill    progesterone 200 MG Oral Cap Place 1 capsule (200 mg total) vaginally nightly. 30 capsule 1    Beclomethasone Diprop HFA (QVAR REDIHALER) 40 MCG/ACT Inhalation Aerosol, Breath Activated Inhale 1-2 puffs into the lungs in the morning and 1-2 puffs before bedtime. 3 each 3    sertraline 50 MG Oral Tab Take 1 tablet (50 mg total) by mouth daily. 90 tablet 3    miSOPROStol 100 MCG Oral Tab Take 1 tablet the evening before the procedure and 1 tablet in the morning before the procedure. (Patient not taking: Reported on 2024) 2 tablet 0    albuterol 108 (90 Base) MCG/ACT Inhalation Aero Soln Inhale 2 puffs into the lungs every 6 (six) hours as needed for Wheezing. (Patient not taking: Reported on 2024) 1 each 5     No current  facility-administered medications on file prior to visit.       Allergies:      No Known Allergies    Family Hx/Carrier status    History reviewed. No pertinent family history.    SocialHx:      Social History     Socioeconomic History    Marital status:    Tobacco Use    Smoking status: Never    Smokeless tobacco: Never   Vaping Use    Vaping status: Never Used   Substance and Sexual Activity    Alcohol use: Not Currently    Drug use: Never    Sexual activity: Yes     Partners: Male     Birth control/protection: Mirena     Social Determinants of Health     Financial Resource Strain: Low Risk  (6/19/2024)    Financial Resource Strain     Difficulty of Paying Living Expenses: Not hard at all     Med Affordability: No   Food Insecurity: No Food Insecurity (6/19/2024)    Food Insecurity     Food Insecurity: Never true   Transportation Needs: No Transportation Needs (6/19/2024)    Transportation Needs     Lack of Transportation: No   Stress: No Stress Concern Present (6/19/2024)    Stress     Feeling of Stress : No   Housing Stability: Low Risk  (6/19/2024)    Housing Stability     Housing Instability: No         Objective:   /81   Pulse 66   Ht 67\"   Wt 125 lb 10.6 oz (57 kg)   LMP 04/25/2024 (Exact Date)   BMI 19.68 kg/m²   Estimated body mass index is 19.68 kg/m² as calculated from the following:    Height as of this encounter: 67\".    Weight as of this encounter: 125 lb 10.6 oz (57 kg).    PE:  General: normal appearance  HEENT: normocephalic  Breast: normal contour  Respiratory: normal work of breathing, no extra use of accessory muscles  Cardiac: normal rate  Abdominal: Nontender  MSK: normal range of motion  Neuro: normal movement, normal sensory  Skin: no abnormalities seen    US OB 1st Trimester Abdominal <14 wks [84143]    Result Date: 6/20/2024  DATING ULTRASOUND SINGLE VIABLE IUP SEEN LMP= 04/25/2024 =01/30/2025 CRL = 1.64 CM = 8.0 WEEKS FHT =165 BPM YOLK SAC SEEN RT OVARY NOT SEEN CERVIX  CLOSED INTRAUTERINE PREGNANCY AT 8 WEEKS 0 DAYS CONFIRMED BY LMP C/W US       Assessment/Plan:     Shea Oro is a 29 year old  female at 8w0d dated by LMP c/w US    #Routine prenatal care  - Labs: NOB   - Last pap:   - NIPT: desires  - Carrier: desires   - Partner: Oumar  - Counseling discussed:   -- prenatal schedule for visits, labs and imaging  -- nutrition, PNVs, physical activity, sexual activity, vaccinations    Prepreg wt category:   #normal wt (BMI 18.5-24.9)  [ ] recommended wt gain 25-35 lbs during preg    Mild intermittent asthma  Continue Qvar daily with albuterol PRN    Anxiety  Continue sertaline  Discussed  withdrawal potential     Psoriasis  Ok to continue topical medications         RTC 4 wk    Nena Salas MD   [facility] - OBGYN

## 2024-06-21 LAB
C TRACH DNA SPEC QL NAA+PROBE: NEGATIVE
N GONORRHOEA DNA SPEC QL NAA+PROBE: NEGATIVE

## 2024-06-23 ENCOUNTER — LAB ENCOUNTER (OUTPATIENT)
Dept: LAB | Facility: HOSPITAL | Age: 29
End: 2024-06-23
Attending: STUDENT IN AN ORGANIZED HEALTH CARE EDUCATION/TRAINING PROGRAM

## 2024-06-23 DIAGNOSIS — Z34.90 PRENATAL CARE, ANTEPARTUM (HCC): ICD-10-CM

## 2024-06-23 LAB
ANTIBODY SCREEN: NEGATIVE
BASOPHILS # BLD AUTO: 0.03 X10(3) UL (ref 0–0.2)
BASOPHILS NFR BLD AUTO: 0.6 %
DEPRECATED HBV CORE AB SER IA-ACNC: 27 NG/ML
EOSINOPHIL # BLD AUTO: 0.02 X10(3) UL (ref 0–0.7)
EOSINOPHIL NFR BLD AUTO: 0.4 %
ERYTHROCYTE [DISTWIDTH] IN BLOOD BY AUTOMATED COUNT: 11.4 %
HBV SURFACE AG SER-ACNC: <0.1 [IU]/L
HBV SURFACE AG SERPL QL IA: NONREACTIVE
HCT VFR BLD AUTO: 36.7 %
HGB BLD-MCNC: 12.7 G/DL
IMM GRANULOCYTES # BLD AUTO: 0.02 X10(3) UL (ref 0–1)
IMM GRANULOCYTES NFR BLD: 0.4 %
LYMPHOCYTES # BLD AUTO: 1.07 X10(3) UL (ref 1–4)
LYMPHOCYTES NFR BLD AUTO: 20.1 %
MCH RBC QN AUTO: 32.2 PG (ref 26–34)
MCHC RBC AUTO-ENTMCNC: 34.6 G/DL (ref 31–37)
MCV RBC AUTO: 92.9 FL
MONOCYTES # BLD AUTO: 0.54 X10(3) UL (ref 0.1–1)
MONOCYTES NFR BLD AUTO: 10.1 %
NEUTROPHILS # BLD AUTO: 3.65 X10 (3) UL (ref 1.5–7.7)
NEUTROPHILS # BLD AUTO: 3.65 X10(3) UL (ref 1.5–7.7)
NEUTROPHILS NFR BLD AUTO: 68.4 %
PLATELET # BLD AUTO: 228 10(3)UL (ref 150–450)
RBC # BLD AUTO: 3.95 X10(6)UL
RH BLOOD TYPE: POSITIVE
RUBV IGG SER QL: POSITIVE
RUBV IGG SER-ACNC: 116.9 IU/ML (ref 10–?)
T PALLIDUM AB SER QL IA: NONREACTIVE
WBC # BLD AUTO: 5.3 X10(3) UL (ref 4–11)

## 2024-06-23 PROCEDURE — 85025 COMPLETE CBC W/AUTO DIFF WBC: CPT

## 2024-06-23 PROCEDURE — 87340 HEPATITIS B SURFACE AG IA: CPT

## 2024-06-23 PROCEDURE — 86900 BLOOD TYPING SEROLOGIC ABO: CPT

## 2024-06-23 PROCEDURE — 86850 RBC ANTIBODY SCREEN: CPT

## 2024-06-23 PROCEDURE — 87389 HIV-1 AG W/HIV-1&-2 AB AG IA: CPT

## 2024-06-23 PROCEDURE — 86780 TREPONEMA PALLIDUM: CPT

## 2024-06-23 PROCEDURE — 36415 COLL VENOUS BLD VENIPUNCTURE: CPT

## 2024-06-23 PROCEDURE — 86762 RUBELLA ANTIBODY: CPT

## 2024-06-23 PROCEDURE — 82728 ASSAY OF FERRITIN: CPT

## 2024-06-23 PROCEDURE — 86901 BLOOD TYPING SEROLOGIC RH(D): CPT

## 2024-07-01 ENCOUNTER — PATIENT MESSAGE (OUTPATIENT)
Dept: OBGYN CLINIC | Facility: CLINIC | Age: 29
End: 2024-07-01

## 2024-07-01 DIAGNOSIS — Z34.01 ENCOUNTER FOR SUPERVISION OF NORMAL FIRST PREGNANCY IN FIRST TRIMESTER (HCC): Primary | ICD-10-CM

## 2024-07-02 NOTE — TELEPHONE ENCOUNTER
From: Shea Oro  To: Nena Salas  Sent: 7/1/2024 8:12 PM CDT  Subject: NIPT/Genetic Testing    Hi, in my last visit I was told the NIPT is available to do as early as 10 weeks. I will be 10 weeks pregnant this Thursday and was wondering how to go about this as I would like to do this earlier rather than later. Do I get an order for this lab test through you? I was also wondering if I could do the genetic carrier test at the same time. Thank you!

## 2024-07-15 ENCOUNTER — TELEPHONE (OUTPATIENT)
Facility: CLINIC | Age: 29
End: 2024-07-15

## 2024-07-15 ENCOUNTER — NURSE ONLY (OUTPATIENT)
Facility: CLINIC | Age: 29
End: 2024-07-15
Payer: COMMERCIAL

## 2024-07-15 VITALS
HEIGHT: 67 IN | BODY MASS INDEX: 20.09 KG/M2 | DIASTOLIC BLOOD PRESSURE: 60 MMHG | WEIGHT: 128 LBS | HEART RATE: 68 BPM | SYSTOLIC BLOOD PRESSURE: 100 MMHG

## 2024-07-15 RX ORDER — FERROUS SULFATE 325(65) MG
325 TABLET, DELAYED RELEASE (ENTERIC COATED) ORAL
COMMUNITY

## 2024-07-15 NOTE — TELEPHONE ENCOUNTER
NIPT and carrier screen drawn per AGATHA Hooker.   Patient aware to contact Jayleen for any billing related questions. Patient advised if not enough blood collected for carrier screen saliva kit will be ordered for patient to complete. Patient agrees. No further questions at this time.

## 2024-07-15 NOTE — PROGRESS NOTES
[place] pregnancy. , 11w4d.     Jayleen NIPS and carrier lab draw per AGATHA Hooker    Specimen tubes name/ labeled verified with patient. Venipuncture completed and tolerated well by patient.   Specimen placed at  for order completion.  Discussed to call office in 1 and 2 weeks for results, results/gender information will be sent in her [employer] portal.  Patient verbalized understanding, agreed to and intend to comply with plan of care.

## 2024-07-24 ENCOUNTER — ROUTINE PRENATAL (OUTPATIENT)
Dept: OBGYN CLINIC | Facility: CLINIC | Age: 29
End: 2024-07-24
Payer: COMMERCIAL

## 2024-07-24 ENCOUNTER — TELEPHONE (OUTPATIENT)
Facility: CLINIC | Age: 29
End: 2024-07-24

## 2024-07-24 VITALS
WEIGHT: 128.63 LBS | BODY MASS INDEX: 20.19 KG/M2 | HEIGHT: 67 IN | SYSTOLIC BLOOD PRESSURE: 111 MMHG | DIASTOLIC BLOOD PRESSURE: 77 MMHG | HEART RATE: 67 BPM

## 2024-07-24 DIAGNOSIS — Z3A.12 12 WEEKS GESTATION OF PREGNANCY (HCC): ICD-10-CM

## 2024-07-24 DIAGNOSIS — Z34.92 PRENATAL CARE IN SECOND TRIMESTER (HCC): Primary | ICD-10-CM

## 2024-07-24 DIAGNOSIS — O99.340 ANXIETY DISORDER AFFECTING PREGNANCY, ANTEPARTUM (HCC): ICD-10-CM

## 2024-07-24 DIAGNOSIS — F41.9 ANXIETY DISORDER AFFECTING PREGNANCY, ANTEPARTUM (HCC): ICD-10-CM

## 2024-07-24 PROCEDURE — 3078F DIAST BP <80 MM HG: CPT | Performed by: STUDENT IN AN ORGANIZED HEALTH CARE EDUCATION/TRAINING PROGRAM

## 2024-07-24 PROCEDURE — 3008F BODY MASS INDEX DOCD: CPT | Performed by: STUDENT IN AN ORGANIZED HEALTH CARE EDUCATION/TRAINING PROGRAM

## 2024-07-24 PROCEDURE — 3074F SYST BP LT 130 MM HG: CPT | Performed by: STUDENT IN AN ORGANIZED HEALTH CARE EDUCATION/TRAINING PROGRAM

## 2024-07-24 NOTE — PROGRESS NOTES
VANESSA 12.6    NO VB, no LOF, no cramping      Shea Oro is a 29 year old  female at 8w0d dated by LMP c/w US    #Routine prenatal care  - Labs: NOB   - Last pap:  nl   - NIPT: LR, boy!   - Carrier: pending  - Partner: Oumar    Prepreg wt category:   #normal wt (BMI 18.5-24.9)  [ ] recommended wt gain 25-35 lbs during preg    Mild intermittent asthma  Continue Qvar daily with albuterol PRN    Anxiety  Continue sertaline  Discussed  withdrawal potential     Psoriasis  Ok to continue topical medications         RTC 4 wk    Nena Salas MD   [facility] - OBGYN

## 2024-07-26 LAB — AMB EXT NATERA CARRIER SCREEN: NEGATIVE

## 2024-07-29 ENCOUNTER — TELEPHONE (OUTPATIENT)
Facility: CLINIC | Age: 29
End: 2024-07-29

## 2024-08-23 ENCOUNTER — ROUTINE PRENATAL (OUTPATIENT)
Facility: CLINIC | Age: 29
End: 2024-08-23
Payer: COMMERCIAL

## 2024-08-23 VITALS
WEIGHT: 130 LBS | BODY MASS INDEX: 20.4 KG/M2 | SYSTOLIC BLOOD PRESSURE: 100 MMHG | HEIGHT: 67 IN | HEART RATE: 85 BPM | DIASTOLIC BLOOD PRESSURE: 78 MMHG

## 2024-08-23 DIAGNOSIS — Z34.02 ENCOUNTER FOR SUPERVISION OF NORMAL FIRST PREGNANCY IN SECOND TRIMESTER (HCC): Primary | ICD-10-CM

## 2024-08-23 DIAGNOSIS — Z3A.17 17 WEEKS GESTATION OF PREGNANCY (HCC): ICD-10-CM

## 2024-08-23 PROCEDURE — 3008F BODY MASS INDEX DOCD: CPT | Performed by: OBSTETRICS & GYNECOLOGY

## 2024-08-23 PROCEDURE — 3074F SYST BP LT 130 MM HG: CPT | Performed by: OBSTETRICS & GYNECOLOGY

## 2024-08-23 PROCEDURE — 3078F DIAST BP <80 MM HG: CPT | Performed by: OBSTETRICS & GYNECOLOGY

## 2024-08-23 NOTE — PROGRESS NOTES
17w1d visit#3  Patient has no complaints  - 20 wk US scheduled     EDC by LMP c/w US      #normal wt (BMI 18.5-24.9)  [ ] recommended wt gain 25-35 lbs during preg    Mild intermittent asthma  Continue Qvar daily with albuterol PRN    Anxiety  Continue sertaline  Discussed  withdrawal potential     Psoriasis  Ok to continue topical medications

## 2024-09-10 ENCOUNTER — ULTRASOUND ENCOUNTER (OUTPATIENT)
Facility: CLINIC | Age: 29
End: 2024-09-10
Payer: COMMERCIAL

## 2024-09-25 ENCOUNTER — ROUTINE PRENATAL (OUTPATIENT)
Facility: CLINIC | Age: 29
End: 2024-09-25
Payer: COMMERCIAL

## 2024-09-25 VITALS
WEIGHT: 134.81 LBS | BODY MASS INDEX: 21.16 KG/M2 | HEART RATE: 77 BPM | HEIGHT: 67 IN | DIASTOLIC BLOOD PRESSURE: 62 MMHG | SYSTOLIC BLOOD PRESSURE: 110 MMHG

## 2024-09-25 DIAGNOSIS — Z3A.21 21 WEEKS GESTATION OF PREGNANCY (HCC): ICD-10-CM

## 2024-09-25 DIAGNOSIS — Z34.02 ENCOUNTER FOR SUPERVISION OF NORMAL FIRST PREGNANCY IN SECOND TRIMESTER (HCC): Primary | ICD-10-CM

## 2024-09-25 PROCEDURE — 3074F SYST BP LT 130 MM HG: CPT | Performed by: OBSTETRICS & GYNECOLOGY

## 2024-09-25 PROCEDURE — 3078F DIAST BP <80 MM HG: CPT | Performed by: OBSTETRICS & GYNECOLOGY

## 2024-09-25 PROCEDURE — 3008F BODY MASS INDEX DOCD: CPT | Performed by: OBSTETRICS & GYNECOLOGY

## 2024-09-25 RX ORDER — CHOLECALCIFEROL (VITAMIN D3) 25 MCG
1 TABLET,CHEWABLE ORAL DAILY
COMMUNITY

## 2024-09-25 NOTE — PROGRESS NOTES
Patient has no complaints    - 20 wk US normal    EDC by LMP c/w US      #normal wt (BMI 18.5-24.9)  [ ] recommended wt gain 25-35 lbs during preg    Mild intermittent asthma  Continue Qvar daily with albuterol PRN    Anxiety  Continue sertaline  Discussed  withdrawal potential     Psoriasis  Ok to continue topical medications

## 2024-10-07 NOTE — TELEPHONE ENCOUNTER
A refill request was received for:  Requested Prescriptions     Pending Prescriptions Disp Refills    Beclomethasone Diprop HFA (QVAR REDIHALER) 40 MCG/ACT Inhalation Aerosol, Breath Activated 3 each 3     Sig: Inhale 1-2 puffs into the lungs in the morning and 1-2 puffs before bedtime.       Last refill date: 01/17/24      Last office visit: 12/04/23      Future Appointments   Date Time Provider Department Center   10/23/2024  9:00 AM [id number]   11/8/2024 10:00 AM Nena Salas MD [facility]  [facility]

## 2024-10-08 RX ORDER — BECLOMETHASONE DIPROPIONATE HFA 40 UG/1
1-2 AEROSOL, METERED RESPIRATORY (INHALATION) 2 TIMES DAILY
Qty: 3 EACH | Refills: 3 | Status: SHIPPED | OUTPATIENT
Start: 2024-10-08

## 2024-10-23 ENCOUNTER — ROUTINE PRENATAL (OUTPATIENT)
Dept: OBGYN CLINIC | Facility: CLINIC | Age: 29
End: 2024-10-23
Payer: COMMERCIAL

## 2024-10-23 VITALS
DIASTOLIC BLOOD PRESSURE: 64 MMHG | HEIGHT: 67 IN | SYSTOLIC BLOOD PRESSURE: 106 MMHG | WEIGHT: 137.81 LBS | HEART RATE: 91 BPM | BODY MASS INDEX: 21.63 KG/M2

## 2024-10-23 DIAGNOSIS — Z34.90 PRENATAL CARE, ANTEPARTUM (HCC): Primary | ICD-10-CM

## 2024-10-23 PROCEDURE — 3074F SYST BP LT 130 MM HG: CPT | Performed by: STUDENT IN AN ORGANIZED HEALTH CARE EDUCATION/TRAINING PROGRAM

## 2024-10-23 PROCEDURE — 3008F BODY MASS INDEX DOCD: CPT | Performed by: STUDENT IN AN ORGANIZED HEALTH CARE EDUCATION/TRAINING PROGRAM

## 2024-10-23 PROCEDURE — 3078F DIAST BP <80 MM HG: CPT | Performed by: STUDENT IN AN ORGANIZED HEALTH CARE EDUCATION/TRAINING PROGRAM

## 2024-10-23 NOTE — PROGRESS NOTES
Patient has no complaints  Feeling regular fetal movement        EDC by LMP c/w US  -NIPT neg  -carrier screen neg  - 20 wk US normal  -1h GTT, CBC ordered      #normal wt (BMI 18.5-24.9)  [ ] recommended wt gain 25-35 lbs during preg    Mild intermittent asthma  Continue Qvar daily with albuterol PRN    Anxiety  Continue sertaline  Discussed  withdrawal potential     Psoriasis  Ok to continue topical medications

## 2024-11-05 ENCOUNTER — LAB ENCOUNTER (OUTPATIENT)
Dept: LAB | Age: 29
End: 2024-11-05
Attending: STUDENT IN AN ORGANIZED HEALTH CARE EDUCATION/TRAINING PROGRAM
Payer: COMMERCIAL

## 2024-11-05 DIAGNOSIS — Z34.90 PRENATAL CARE, ANTEPARTUM (HCC): ICD-10-CM

## 2024-11-05 LAB
BASOPHILS # BLD AUTO: 0.02 X10(3) UL (ref 0–0.2)
BASOPHILS NFR BLD AUTO: 0.3 %
EOSINOPHIL # BLD AUTO: 0.02 X10(3) UL (ref 0–0.7)
EOSINOPHIL NFR BLD AUTO: 0.3 %
ERYTHROCYTE [DISTWIDTH] IN BLOOD BY AUTOMATED COUNT: 12 %
GLUCOSE 1H P GLC SERPL-MCNC: 119 MG/DL
HCT VFR BLD AUTO: 35.5 %
HGB BLD-MCNC: 12.3 G/DL
IMM GRANULOCYTES # BLD AUTO: 0.05 X10(3) UL (ref 0–1)
IMM GRANULOCYTES NFR BLD: 0.7 %
LYMPHOCYTES # BLD AUTO: 0.96 X10(3) UL (ref 1–4)
LYMPHOCYTES NFR BLD AUTO: 12.5 %
MCH RBC QN AUTO: 32.5 PG (ref 26–34)
MCHC RBC AUTO-ENTMCNC: 34.6 G/DL (ref 31–37)
MCV RBC AUTO: 93.9 FL
MONOCYTES # BLD AUTO: 0.58 X10(3) UL (ref 0.1–1)
MONOCYTES NFR BLD AUTO: 7.6 %
NEUTROPHILS # BLD AUTO: 6.05 X10 (3) UL (ref 1.5–7.7)
NEUTROPHILS # BLD AUTO: 6.05 X10(3) UL (ref 1.5–7.7)
NEUTROPHILS NFR BLD AUTO: 78.6 %
PLATELET # BLD AUTO: 213 10(3)UL (ref 150–450)
RBC # BLD AUTO: 3.78 X10(6)UL
WBC # BLD AUTO: 7.7 X10(3) UL (ref 4–11)

## 2024-11-05 PROCEDURE — 82950 GLUCOSE TEST: CPT

## 2024-11-05 PROCEDURE — 36415 COLL VENOUS BLD VENIPUNCTURE: CPT

## 2024-11-05 PROCEDURE — 85025 COMPLETE CBC W/AUTO DIFF WBC: CPT

## 2024-11-06 ENCOUNTER — TELEPHONE (OUTPATIENT)
Facility: CLINIC | Age: 29
End: 2024-11-06

## 2024-11-06 NOTE — TELEPHONE ENCOUNTER
Medical clearance for dental treatment for OB patient received from [facility], placed in GK bin to be reviewed/signed.

## 2024-11-08 ENCOUNTER — ROUTINE PRENATAL (OUTPATIENT)
Facility: CLINIC | Age: 29
End: 2024-11-08
Payer: COMMERCIAL

## 2024-11-08 VITALS
WEIGHT: 145 LBS | HEART RATE: 75 BPM | BODY MASS INDEX: 23 KG/M2 | SYSTOLIC BLOOD PRESSURE: 114 MMHG | DIASTOLIC BLOOD PRESSURE: 60 MMHG

## 2024-11-08 DIAGNOSIS — Z11.3 SCREEN FOR STD (SEXUALLY TRANSMITTED DISEASE): ICD-10-CM

## 2024-11-08 DIAGNOSIS — Z3A.28 28 WEEKS GESTATION OF PREGNANCY (HCC): ICD-10-CM

## 2024-11-08 DIAGNOSIS — O99.340 ANXIETY DISORDER AFFECTING PREGNANCY, ANTEPARTUM (HCC): ICD-10-CM

## 2024-11-08 DIAGNOSIS — Z34.93 PRENATAL CARE IN THIRD TRIMESTER (HCC): Primary | ICD-10-CM

## 2024-11-08 DIAGNOSIS — Z71.85 VACCINE COUNSELING: ICD-10-CM

## 2024-11-08 DIAGNOSIS — F41.9 ANXIETY DISORDER AFFECTING PREGNANCY, ANTEPARTUM (HCC): ICD-10-CM

## 2024-11-08 PROCEDURE — 90715 TDAP VACCINE 7 YRS/> IM: CPT | Performed by: STUDENT IN AN ORGANIZED HEALTH CARE EDUCATION/TRAINING PROGRAM

## 2024-11-08 PROCEDURE — 3078F DIAST BP <80 MM HG: CPT | Performed by: STUDENT IN AN ORGANIZED HEALTH CARE EDUCATION/TRAINING PROGRAM

## 2024-11-08 PROCEDURE — 90471 IMMUNIZATION ADMIN: CPT | Performed by: STUDENT IN AN ORGANIZED HEALTH CARE EDUCATION/TRAINING PROGRAM

## 2024-11-08 PROCEDURE — 3074F SYST BP LT 130 MM HG: CPT | Performed by: STUDENT IN AN ORGANIZED HEALTH CARE EDUCATION/TRAINING PROGRAM

## 2024-11-19 ENCOUNTER — LAB ENCOUNTER (OUTPATIENT)
Dept: LAB | Age: 29
End: 2024-11-19
Attending: STUDENT IN AN ORGANIZED HEALTH CARE EDUCATION/TRAINING PROGRAM
Payer: COMMERCIAL

## 2024-11-19 DIAGNOSIS — Z11.3 SCREEN FOR STD (SEXUALLY TRANSMITTED DISEASE): ICD-10-CM

## 2024-11-19 DIAGNOSIS — Z34.93 PRENATAL CARE IN THIRD TRIMESTER (HCC): ICD-10-CM

## 2024-11-19 LAB — T PALLIDUM AB SER QL IA: NONREACTIVE

## 2024-11-19 PROCEDURE — 87389 HIV-1 AG W/HIV-1&-2 AB AG IA: CPT

## 2024-11-19 PROCEDURE — 86780 TREPONEMA PALLIDUM: CPT

## 2024-11-19 PROCEDURE — 36415 COLL VENOUS BLD VENIPUNCTURE: CPT

## 2024-11-22 ENCOUNTER — ROUTINE PRENATAL (OUTPATIENT)
Dept: OBGYN CLINIC | Facility: CLINIC | Age: 29
End: 2024-11-22
Payer: COMMERCIAL

## 2024-11-22 VITALS
DIASTOLIC BLOOD PRESSURE: 79 MMHG | HEART RATE: 83 BPM | BODY MASS INDEX: 23 KG/M2 | WEIGHT: 146.06 LBS | SYSTOLIC BLOOD PRESSURE: 114 MMHG

## 2024-11-22 DIAGNOSIS — Z3A.30 30 WEEKS GESTATION OF PREGNANCY (HCC): Primary | ICD-10-CM

## 2024-11-22 PROCEDURE — 3074F SYST BP LT 130 MM HG: CPT

## 2024-11-22 PROCEDURE — 3078F DIAST BP <80 MM HG: CPT

## 2024-11-22 NOTE — PROGRESS NOTES
Ji 30w1d    She is doing well, no complaints. Baby active     EDC by LMP c/w US  -NIPT neg  -carrier screen neg  - 20 wk US normal  -1h GTT, CBC   -Tdap done   -RSV discussed - will check with insurance, ask at next visit   -flu vaccine done   -3rd tri HIV & T pal done         #normal wt (BMI 18.5-24.9)  [ ] recommended wt gain 25-35 lbs during preg     Mild intermittent asthma  Arnuity with albuterol PRN     Anxiety  Continue sertaline  Discussed  withdrawal potential      Psoriasis  Actually has gotten a lot better in pregnancy   Ok to continue topical medications

## 2024-12-03 ENCOUNTER — TELEPHONE (OUTPATIENT)
Facility: CLINIC | Age: 29
End: 2024-12-03

## 2024-12-03 ENCOUNTER — HOSPITAL ENCOUNTER (OUTPATIENT)
Facility: HOSPITAL | Age: 29
Discharge: HOME | End: 2024-12-03
Attending: OBSTETRICS & GYNECOLOGY | Admitting: OBSTETRICS & GYNECOLOGY
Payer: COMMERCIAL

## 2024-12-03 VITALS
BODY MASS INDEX: 23 KG/M2 | DIASTOLIC BLOOD PRESSURE: 76 MMHG | SYSTOLIC BLOOD PRESSURE: 116 MMHG | HEART RATE: 69 BPM | WEIGHT: 146 LBS

## 2024-12-03 PROBLEM — J45.909 MATERNAL ASTHMA COMPLICATING PREGNANCY (HCC): Status: ACTIVE | Noted: 2024-12-03

## 2024-12-03 PROBLEM — R51.9 HEADACHE IN PREGNANCY, THIRD TRIMESTER (HCC): Status: ACTIVE | Noted: 2024-12-03

## 2024-12-03 PROBLEM — F41.9 ANXIETY DISORDER AFFECTING PREGNANCY, ANTEPARTUM (HCC): Status: ACTIVE | Noted: 2024-12-03

## 2024-12-03 PROBLEM — O99.340 ANXIETY DISORDER AFFECTING PREGNANCY, ANTEPARTUM (HCC): Status: ACTIVE | Noted: 2024-12-03

## 2024-12-03 PROBLEM — Z3A.31 31 WEEKS GESTATION OF PREGNANCY (HCC): Status: ACTIVE | Noted: 2024-12-03

## 2024-12-03 PROBLEM — O99.519 MATERNAL ASTHMA COMPLICATING PREGNANCY (HCC): Status: ACTIVE | Noted: 2024-12-03

## 2024-12-03 PROBLEM — Z97.5 IUD (INTRAUTERINE DEVICE) IN PLACE: Status: RESOLVED | Noted: 2022-11-09 | Resolved: 2024-12-03

## 2024-12-03 PROBLEM — O26.893 HEADACHE IN PREGNANCY, THIRD TRIMESTER (HCC): Status: ACTIVE | Noted: 2024-12-03

## 2024-12-03 PROBLEM — Z79.899 ON SSRI THERAPY: Status: ACTIVE | Noted: 2024-12-03

## 2024-12-03 LAB
ALBUMIN SERPL-MCNC: 3.7 G/DL (ref 3.2–4.8)
ALBUMIN/GLOB SERPL: 1.4 {RATIO} (ref 1–2)
ALP LIVER SERPL-CCNC: 102 U/L
ALT SERPL-CCNC: 14 U/L
ANION GAP SERPL CALC-SCNC: 6 MMOL/L (ref 0–18)
AST SERPL-CCNC: 21 U/L (ref ?–34)
BASOPHILS # BLD AUTO: 0.02 X10(3) UL (ref 0–0.2)
BASOPHILS NFR BLD AUTO: 0.3 %
BILIRUB SERPL-MCNC: 0.3 MG/DL (ref 0.3–1.2)
BUN BLD-MCNC: 9 MG/DL (ref 9–23)
CALCIUM BLD-MCNC: 9.2 MG/DL (ref 8.7–10.4)
CHLORIDE SERPL-SCNC: 104 MMOL/L (ref 98–112)
CO2 SERPL-SCNC: 27 MMOL/L (ref 21–32)
CREAT BLD-MCNC: 0.72 MG/DL
CREAT UR-SCNC: 37.2 MG/DL
EGFRCR SERPLBLD CKD-EPI 2021: 116 ML/MIN/1.73M2 (ref 60–?)
EOSINOPHIL # BLD AUTO: 0.02 X10(3) UL (ref 0–0.7)
EOSINOPHIL NFR BLD AUTO: 0.3 %
ERYTHROCYTE [DISTWIDTH] IN BLOOD BY AUTOMATED COUNT: 12.1 %
FASTING STATUS PATIENT QL REPORTED: NO
GLOBULIN PLAS-MCNC: 2.6 G/DL (ref 2–3.5)
GLUCOSE BLD-MCNC: 90 MG/DL (ref 70–99)
HCT VFR BLD AUTO: 35.1 %
HGB BLD-MCNC: 12.2 G/DL
IMM GRANULOCYTES # BLD AUTO: 0.03 X10(3) UL (ref 0–1)
IMM GRANULOCYTES NFR BLD: 0.5 %
LYMPHOCYTES # BLD AUTO: 0.91 X10(3) UL (ref 1–4)
LYMPHOCYTES NFR BLD AUTO: 14.2 %
MCH RBC QN AUTO: 32.7 PG (ref 26–34)
MCHC RBC AUTO-ENTMCNC: 34.8 G/DL (ref 31–37)
MCV RBC AUTO: 94.1 FL
MONOCYTES # BLD AUTO: 0.54 X10(3) UL (ref 0.1–1)
MONOCYTES NFR BLD AUTO: 8.5 %
NEUTROPHILS # BLD AUTO: 4.87 X10 (3) UL (ref 1.5–7.7)
NEUTROPHILS # BLD AUTO: 4.87 X10(3) UL (ref 1.5–7.7)
NEUTROPHILS NFR BLD AUTO: 76.2 %
OSMOLALITY SERPL CALC.SUM OF ELEC: 282 MOSM/KG (ref 275–295)
PLATELET # BLD AUTO: 210 10(3)UL (ref 150–450)
POTASSIUM SERPL-SCNC: 4.1 MMOL/L (ref 3.5–5.1)
PROT SERPL-MCNC: 6.3 G/DL (ref 5.7–8.2)
PROT UR-MCNC: <6 MG/DL (ref ?–14)
RBC # BLD AUTO: 3.73 X10(6)UL
SODIUM SERPL-SCNC: 137 MMOL/L (ref 136–145)
WBC # BLD AUTO: 6.4 X10(3) UL (ref 4–11)

## 2024-12-03 PROCEDURE — 59025 FETAL NON-STRESS TEST: CPT | Performed by: OBSTETRICS & GYNECOLOGY

## 2024-12-03 NOTE — TELEPHONE ENCOUNTER
Spoke with patient. Aware she needs to go to L&D for evaluation for preeclampsia with labs & serial BP's. Verbalized understanding.    L&D aware.

## 2024-12-03 NOTE — DISCHARGE INSTRUCTIONS
Please use nasal saline spray to keep nasal & sinus passages moist  May use Nasocort or Flonase steroid nasal sprays to help with nasal/sinus irritation as well      Hypertension related to pregnancy/postpartum    -Watch for symptoms of pre-eclampsia (severe or persistent headache not relieved with a dose of tylenol, visual disturbances, persistent or severe upper abdominal pain, shortness of breath, chest pain)  -Please obtain a blood pressure cuff for home from the list of [place] Blood Pressure Validated Device Listing- see www.validatebp.org  -Check blood pressure (and heart rate if you can) 2-3 times per day & more frequently if feeling poorly. Keep log & bring to visits.      If BP is 140/90 or higher (either number) you will likely be prescribed oral antihypertensive medication. Check blood pressure before a dose of medication. Can hold the medicine if you feel your blood pressure is getting too low (less than 110/70) or you are lightheaded.      If BP is 160/110 (either number) or higher, or you develop symptoms of pre-eclampsia, please immediately go to the emergency department at [facility] & let them know you may have pre-eclampsia. You will likely require IV antihypertensives and IV magnesium sulfate to prevent stroke and seizures.

## 2024-12-03 NOTE — TELEPHONE ENCOUNTER
31 5/7 migraine Sunday night. She still has a headache lingering. Her BP and it is 139/86. 138/91.     Took Tylenol for her migraine felt better. Had visual changes/aura. Denies epigastric pain or swelling, cramping, bleeding. Feel like a sinus headache- frontal HA, behind her eyes.     FM+    Can take Tylenol for HA and monitor BP. If your blood pressure is 140s/90s, any swelling, visual spots/changes, headache, or epigastric pain to call office. Patient verbalized understanding, agreed to and intend to comply with plan of care.

## 2024-12-03 NOTE — NST
Nonstress Test   Patient: Shea Oro    Gestation: 31w5d    NST:       Variability: Moderate           Accelerations: Yes           Decelerations: None            Baseline: 155 BPM           Uterine Irritability: No           Contractions: Irregular                                        Acoustic Stimulator: No           Nonstress Test Interpretation: Reactive                      FHR Category: Category I          Additional Comments Comments: MD in hospitals reviewed NST verbalized no note needs to be sent

## 2024-12-03 NOTE — TELEPHONE ENCOUNTER
Patient stated she got a migraine Sunday night. She still has a headache lingering. She took her BP and it is 139/8 ,she stated this is higher then usual for her.  Please advise.

## 2024-12-03 NOTE — NST
12/3/2024  NST at 31w5d     Indication: headache, elevated BP at home    29 year old  female at 31w5d contacted OB office c/o migraine on  night 24 - had visual changes/aura & took Tylenol - helped with headache. Still has lingering HA, Frontal, behind her eyes. BP at home 139/86 & 138/91. She was advised to present to L&D for evaluation for possible pre-eclampsia.     Here on L&D no longer having headache. Has had a nosebleed though. H/o migraine headaches.     Vitals:    24 1323 24 1330 24 1345 24 1400   BP:  119/74 115/69 116/76   Pulse:  70 69 69   Weight: 146 lb (66.2 kg)         Pulse:  [69-71] 69  BP: (115-128)/(69-76) 116/76      bpm, mod rafael, +accels, no decels   [place] occasional irritability   SVE deferred    Labs:  Component      Latest Ref Rng 12/3/2024  1:09 PM   WBC      4.0 - 11.0 x10(3) uL 6.4    RBC      3.80 - 5.30 x10(6)uL 3.73 (L)    Hemoglobin      12.0 - 16.0 g/dL 12.2    Hematocrit      35.0 - 48.0 % 35.1    Platelet Count      150.0 - 450.0 10(3)uL 210.0    MCV      80.0 - 100.0 fL 94.1    MCH      26.0 - 34.0 pg 32.7    MCHC      31.0 - 37.0 g/dL 34.8    RDW      % 12.1    Prelim Neutrophil Abs      1.50 - 7.70 x10 (3) uL 4.87    Neutrophils Absolute      1.50 - 7.70 x10(3) uL 4.87    Lymphocytes Absolute      1.00 - 4.00 x10(3) uL 0.91 (L)    Monocytes Absolute      0.10 - 1.00 x10(3) uL 0.54    Eosinophils Absolute      0.00 - 0.70 x10(3) uL 0.02    Basophils Absolute      0.00 - 0.20 x10(3) uL 0.02    Immature Granulocyte Absolute      0.00 - 1.00 x10(3) uL 0.03    Neutrophils %      % 76.2    Lymphocytes %      % 14.2    Monocytes %      % 8.5    Eosinophils %      % 0.3    Basophils %      % 0.3    Immature Granulocyte %      % 0.5    Glucose      70 - 99 mg/dL 90    Sodium      136 - 145 mmol/L 137    Potassium      3.5 - 5.1 mmol/L 4.1    Chloride      98 - 112 mmol/L 104    Carbon Dioxide, Total      21.0 - 32.0 mmol/L 27.0    ANION  GAP      0 - 18 mmol/L 6    BUN      9 - 23 mg/dL 9    CREATININE      0.55 - 1.02 mg/dL 0.72    CALCIUM      8.7 - 10.4 mg/dL 9.2    CALCULATED OSMOLALITY      275 - 295 mOsm/kg 282    EGFR      >=60 mL/min/1.73m2 116    AST (SGOT)      <34 U/L 21    ALT (SGPT)      10 - 49 U/L 14    ALKALINE PHOSPHATASE      37 - 98 U/L 102 (H)    Total Bilirubin      0.3 - 1.2 mg/dL 0.3    PROTEIN, TOTAL      5.7 - 8.2 g/dL 6.3    Albumin      3.2 - 4.8 g/dL 3.7    Globulin      2.0 - 3.5 g/dL 2.6    A/G Ratio      1.0 - 2.0  1.4    Patient Fasting for CMP? No    TOTAL PROTEIN URINE RANDOM      <14.0 mg/dL <6.0    CREATININE UR RANDOM      mg/dL 37.20    Urine Protein/Creatinine Ratio, Random --         A/P 29 year old  at 31w5d - recent migraine with visual aura on 24, now denying headache, had mildly elevated BP at work today. Here with normal BP, reassuring labs, reassuring fetal tracing. No evidence of pre-eclampsia. Given recent nosebleed, suspect some sinusitis from the dry, cold weather.     +FWB - NST - reactive and appropriate for gestational age  Headache - resolved.   BP normal   Labs - normal/negative.   Nosebleed - may use nasal saline spray, nasal corticosteroid spray to keep sinus and nasal mucosa moist   Disposition - discharge home.     Yvonne Manrique MD

## 2024-12-06 ENCOUNTER — TELEPHONE (OUTPATIENT)
Facility: CLINIC | Age: 29
End: 2024-12-06

## 2024-12-06 ENCOUNTER — ROUTINE PRENATAL (OUTPATIENT)
Facility: CLINIC | Age: 29
End: 2024-12-06
Payer: COMMERCIAL

## 2024-12-06 VITALS
HEIGHT: 67 IN | DIASTOLIC BLOOD PRESSURE: 68 MMHG | SYSTOLIC BLOOD PRESSURE: 110 MMHG | BODY MASS INDEX: 23.57 KG/M2 | WEIGHT: 150.19 LBS | HEART RATE: 80 BPM

## 2024-12-06 DIAGNOSIS — Z3A.32 32 WEEKS GESTATION OF PREGNANCY (HCC): ICD-10-CM

## 2024-12-06 DIAGNOSIS — Z34.03 ENCOUNTER FOR SUPERVISION OF NORMAL FIRST PREGNANCY IN THIRD TRIMESTER (HCC): Primary | ICD-10-CM

## 2024-12-06 PROCEDURE — 3008F BODY MASS INDEX DOCD: CPT | Performed by: OBSTETRICS & GYNECOLOGY

## 2024-12-06 PROCEDURE — 90471 IMMUNIZATION ADMIN: CPT | Performed by: OBSTETRICS & GYNECOLOGY

## 2024-12-06 PROCEDURE — 90678 RSV VACC PREF BIVALENT IM: CPT | Performed by: OBSTETRICS & GYNECOLOGY

## 2024-12-06 PROCEDURE — 3074F SYST BP LT 130 MM HG: CPT | Performed by: OBSTETRICS & GYNECOLOGY

## 2024-12-06 PROCEDURE — 3078F DIAST BP <80 MM HG: CPT | Performed by: OBSTETRICS & GYNECOLOGY

## 2024-12-06 NOTE — PROGRESS NOTES
Patient has no complaints    EDC by LMP c/w US  -NIPT neg  -carrier screen neg  - 20 wk US normal    -Tdap done   -flu,RSV vaccine done   -3rd tri HIV & T pal done            Mild intermittent asthma  Arnuity with albuterol PRN     Anxiety  Continue sertaline  Discussed  withdrawal potential      Psoriasis  Actually has gotten a lot better in pregnancy   Ok to continue topical medications

## 2024-12-20 ENCOUNTER — ROUTINE PRENATAL (OUTPATIENT)
Dept: OBGYN CLINIC | Facility: CLINIC | Age: 29
End: 2024-12-20
Payer: COMMERCIAL

## 2024-12-20 VITALS
DIASTOLIC BLOOD PRESSURE: 74 MMHG | HEIGHT: 67 IN | HEART RATE: 64 BPM | SYSTOLIC BLOOD PRESSURE: 113 MMHG | BODY MASS INDEX: 24 KG/M2

## 2024-12-20 DIAGNOSIS — Z3A.34 34 WEEKS GESTATION OF PREGNANCY (HCC): Primary | ICD-10-CM

## 2024-12-20 PROCEDURE — 3078F DIAST BP <80 MM HG: CPT

## 2024-12-20 PROCEDURE — 3074F SYST BP LT 130 MM HG: CPT

## 2024-12-20 NOTE — PROGRESS NOTES
[id number]    She is doing well, no complaints  -has peds chosen     EDC by LMP c/w  8w0d  US  -NIPT neg  -carrier screen neg  - 20 wk US normal  -Tdap done   -flu,RSV vaccine done   -3rd tri HIV & T pal done            Mild intermittent asthma  Arnuity with albuterol PRN     Anxiety  Continue sertaline  Discussed  withdrawal potential      Psoriasis  Actually has gotten a lot better in pregnancy   Ok to continue topical medications     RTC 2wk, GBS at next visit

## 2025-01-02 ENCOUNTER — ROUTINE PRENATAL (OUTPATIENT)
Facility: CLINIC | Age: 30
End: 2025-01-02
Payer: COMMERCIAL

## 2025-01-02 VITALS
BODY MASS INDEX: 25.11 KG/M2 | WEIGHT: 160 LBS | SYSTOLIC BLOOD PRESSURE: 122 MMHG | DIASTOLIC BLOOD PRESSURE: 70 MMHG | HEIGHT: 67 IN | HEART RATE: 72 BPM

## 2025-01-02 DIAGNOSIS — Z34.03 ENCOUNTER FOR SUPERVISION OF NORMAL FIRST PREGNANCY IN THIRD TRIMESTER (HCC): Primary | ICD-10-CM

## 2025-01-02 DIAGNOSIS — Z3A.36 36 WEEKS GESTATION OF PREGNANCY (HCC): ICD-10-CM

## 2025-01-02 PROCEDURE — 3074F SYST BP LT 130 MM HG: CPT | Performed by: OBSTETRICS & GYNECOLOGY

## 2025-01-02 PROCEDURE — 87081 CULTURE SCREEN ONLY: CPT | Performed by: OBSTETRICS & GYNECOLOGY

## 2025-01-02 PROCEDURE — 3078F DIAST BP <80 MM HG: CPT | Performed by: OBSTETRICS & GYNECOLOGY

## 2025-01-02 PROCEDURE — 3008F BODY MASS INDEX DOCD: CPT | Performed by: OBSTETRICS & GYNECOLOGY

## 2025-01-02 PROCEDURE — 87150 DNA/RNA AMPLIFIED PROBE: CPT | Performed by: OBSTETRICS & GYNECOLOGY

## 2025-01-02 NOTE — PROGRESS NOTES
Patient has no complaints, some contractions at night    SVE: closed/50/-2    - labor instructions, patient would like to have as natural labor as possible   -has peds chosen     EDC by LMP c/w  8w0d  US  -NIPT neg  -carrier screen neg  - 20 wk US normal  -Tdap done   -flu,RSV vaccine done   -3rd tri HIV & T pal done            Mild intermittent asthma  Arnuity with albuterol PRN     Anxiety  Continue sertaline  Discussed  withdrawal potential      Psoriasis  Actually has gotten a lot better in pregnancy   Ok to continue topical medications

## 2025-01-04 LAB — GROUP B STREP BY PCR FOR PCR OVT: NEGATIVE

## 2025-01-08 PROBLEM — Z3A.31 31 WEEKS GESTATION OF PREGNANCY (HCC): Status: RESOLVED | Noted: 2024-12-03 | Resolved: 2025-01-08

## 2025-01-08 PROBLEM — O26.03 EXCESSIVE WEIGHT GAIN DURING PREGNANCY IN THIRD TRIMESTER (HCC): Status: ACTIVE | Noted: 2025-01-08

## 2025-01-09 ENCOUNTER — TELEPHONE (OUTPATIENT)
Facility: CLINIC | Age: 30
End: 2025-01-09

## 2025-01-09 ENCOUNTER — ROUTINE PRENATAL (OUTPATIENT)
Facility: CLINIC | Age: 30
End: 2025-01-09
Payer: COMMERCIAL

## 2025-01-09 VITALS
BODY MASS INDEX: 25.3 KG/M2 | SYSTOLIC BLOOD PRESSURE: 118 MMHG | DIASTOLIC BLOOD PRESSURE: 64 MMHG | HEART RATE: 74 BPM | WEIGHT: 161.19 LBS | HEIGHT: 67 IN

## 2025-01-09 DIAGNOSIS — O26.03 EXCESSIVE WEIGHT GAIN DURING PREGNANCY IN THIRD TRIMESTER (HCC): Primary | ICD-10-CM

## 2025-01-09 DIAGNOSIS — J45.909 MATERNAL ASTHMA COMPLICATING PREGNANCY (HCC): ICD-10-CM

## 2025-01-09 DIAGNOSIS — O99.519 MATERNAL ASTHMA COMPLICATING PREGNANCY (HCC): ICD-10-CM

## 2025-01-09 DIAGNOSIS — F41.9 ANXIETY DISORDER AFFECTING PREGNANCY, ANTEPARTUM (HCC): ICD-10-CM

## 2025-01-09 DIAGNOSIS — O99.340 ANXIETY DISORDER AFFECTING PREGNANCY, ANTEPARTUM (HCC): ICD-10-CM

## 2025-01-09 DIAGNOSIS — Z79.899 ON SSRI THERAPY: ICD-10-CM

## 2025-01-09 PROCEDURE — 3074F SYST BP LT 130 MM HG: CPT | Performed by: OBSTETRICS & GYNECOLOGY

## 2025-01-09 PROCEDURE — 3008F BODY MASS INDEX DOCD: CPT | Performed by: OBSTETRICS & GYNECOLOGY

## 2025-01-09 PROCEDURE — 3078F DIAST BP <80 MM HG: CPT | Performed by: OBSTETRICS & GYNECOLOGY

## 2025-01-09 NOTE — PATIENT INSTRUCTIONS
Try to avoid additional weight gain.     EXCESSIVE WEIGHT GAIN  Gaining too much weight increases the risk of a large fetus. A larger fetus places itself and the mother at risk for injury during birth and increases the risk that it will not be able to descend through the pelvis, making a  section necessary. Larger fetuses can struggle with low blood sugars (hypoglycemia) as a  which is not good for the brain.     Controlling weight gain in pregnancy:  Look at current diet - write down everything you eat for a few days. Count up your mg protein, grams of fiber, etc.   Protein - aim for at least 100 grams per day. [email or web address]. Add plain (no herbal additives, etc) protein powder if needed. Whey, hemp, pea protein, etc all fine  Water - Aim for 1 gallon per day  Fiber rich foods. 25-28 grams per day.   Heartburn medication if needed   Avoid fast/simple carbohydrates (sodas) - this can spike your insulin levels & can trigger hypoglycemia which can cause ravenous hunger  Adequate sleep important for hormonal regulation of appetite.   Avoid high sodium foods. Aim for potassium-rich foods. These will have a slight diuretic effect.   Walk for at least 15 minutes after every meal.     Basic sample meal plan:    Breakfast: 15-30 g carbs for breakfast  Mid morning snack (if hungry): 15 -30 g carb   Lunch: 45-60 g carb  Mid afternoon snack (if hungry): 15-30 g carb  Dinner: 45-60 g carb   Bedtime snack if hungry can be 15 g carb with some protein.  Snack should be between 8-10 pm         [facility] Prenatal Classes (and virtual tour of Labor & Delivery unit)  [email or web address]  [phone]    Pre-registration for the hospital  [email or web address]    Breast pump  -contact your insurance to request them to send an order to us for their preferred medical supply company  Or  -contact [employer] (flyer available on the lobby wall across from reception desk)    [email or web address]    Car seat *MUST* be installed before infant(s) can be leave the hospital    Doctor for baby   *Please select a pediatrician or family medicine provider BEFORE you are admitted to the hospital to give birth.   Pediatrics (birth-18 years of age) or Family Medicine (birth through adulthood)  Make sure that provider accepts your insurance.   Pick a provider that is close to where you live.  If the provider is on staff at [facility], the nursing staff will notify them when your infant is born so they are aware to come to the hospital to examine the infant.   If the provider you have chosen is not on staff at [facility], a pediatric hospitalist will care for your infant during the hospitalization only. You must arrange the follow up visit with your provider.   After delivery at the hospital follow up visit instructions for baby will be provided prior to discharge.     The baby will not be able to leave the hospital without a follow up visit scheduled.     To establish care:  [email or web address]  Or   [facility] Physician Referral line at [phone]    There are MANY providers available. It would be impossible to list them all, but here are a few popular pediatrics groups in [place]:    [facility] [place] [phone]  [facility] [place] [phone]  [facility] [place] [phone]  [facility] [place] [phone]  [facility] [place] [phone]  [facility] [phone]    There are also many wonderful independent practitioners as well. We recommend you speak with family, friends, colleagues as personal recommendations can be very helpful.

## 2025-01-09 NOTE — TELEPHONE ENCOUNTER
LA paperwork was received, $25 payment was processed and forms were emailed & inter-office mailed to Forms Department.     Pt advised to call Forms Department for any questions or concerns at [phone].

## 2025-01-09 NOTE — PROGRESS NOTES
VANESSA 37w0d - visit #11    Chief Complaint   Patient presents with    Prenatal Care     37 weeks   Partner here      +FM. Irregular contractions for the past few weeks. Passed some mucus last week. No leaking fluid, vaginal bleeding. Occasional mild headache & sometimes tiny bit of floaters - infrequent, mild. No epigastric pain.      Is an OT - walking a lot. Feels baby has dropped - easier to breathe. Belly still definitely growing    Vitals:    25 1049   BP: 118/64   Pulse: 74   Weight: 161 lb 3.2 oz (73.1 kg)   Height: 67\"      TWG 40 lb 3.2 oz (18.2 kg)     29 year old  at 37w0d  MABEL 25 by LMP c/w  8w0d  US  O+/GBS neg     -NIPT neg, BOY  -carrier screen neg  - 20 wk US normal  -Tdap & Flu  done   -RSV vaccine done 24  - tri HIV & T pal done   -labor instructions, patient would like to have as natural labor as possible   -has peds chosen   -breast pump, car seat discussed    -Cervix - closed (can tunnel a few cm into canal)/50/-2. Soft. Still posterior.      Discussed IOL at 39-40 wk. Would not advise going past 41 wk due to increased risk of placental insufficiency & stillbirth   -patient would like to try to wait for now. Will schedule for 41 wk - message sent to       Excessive weight gain  -pre preg BMI 19 - wt gain goal 25-35 lb. Up 40 lb  -Cautioned regarding increased risk of fetal macrosomia, birth injury for fetus and mother, need for  section, risk of  hypoglycemia.     Mild intermittent asthma  -Arnuity with albuterol PRN     Anxiety  -Continue sertraline  -Discussed  withdrawal potential at previous      Psoriasis  -Actually has gotten a lot better in pregnancy   -Ok to continue topical medications     RTC 1 wk

## 2025-01-13 ENCOUNTER — ROUTINE PRENATAL (OUTPATIENT)
Facility: CLINIC | Age: 30
End: 2025-01-13
Payer: COMMERCIAL

## 2025-01-13 VITALS
HEIGHT: 67 IN | DIASTOLIC BLOOD PRESSURE: 64 MMHG | HEART RATE: 69 BPM | WEIGHT: 160.19 LBS | SYSTOLIC BLOOD PRESSURE: 110 MMHG | BODY MASS INDEX: 25.14 KG/M2

## 2025-01-13 DIAGNOSIS — O99.519 MATERNAL ASTHMA COMPLICATING PREGNANCY (HCC): ICD-10-CM

## 2025-01-13 DIAGNOSIS — F41.9 ANXIETY DISORDER AFFECTING PREGNANCY, ANTEPARTUM (HCC): ICD-10-CM

## 2025-01-13 DIAGNOSIS — Z34.90 PRENATAL CARE, ANTEPARTUM (HCC): Primary | ICD-10-CM

## 2025-01-13 DIAGNOSIS — J45.909 MATERNAL ASTHMA COMPLICATING PREGNANCY (HCC): ICD-10-CM

## 2025-01-13 DIAGNOSIS — Z79.899 ON SSRI THERAPY: ICD-10-CM

## 2025-01-13 DIAGNOSIS — O99.340 ANXIETY DISORDER AFFECTING PREGNANCY, ANTEPARTUM (HCC): ICD-10-CM

## 2025-01-13 DIAGNOSIS — O26.03 EXCESSIVE WEIGHT GAIN DURING PREGNANCY IN THIRD TRIMESTER (HCC): ICD-10-CM

## 2025-01-13 DIAGNOSIS — Z3A.37 37 WEEKS GESTATION OF PREGNANCY (HCC): ICD-10-CM

## 2025-01-13 PROCEDURE — 3074F SYST BP LT 130 MM HG: CPT | Performed by: STUDENT IN AN ORGANIZED HEALTH CARE EDUCATION/TRAINING PROGRAM

## 2025-01-13 PROCEDURE — 3008F BODY MASS INDEX DOCD: CPT | Performed by: STUDENT IN AN ORGANIZED HEALTH CARE EDUCATION/TRAINING PROGRAM

## 2025-01-13 PROCEDURE — 3078F DIAST BP <80 MM HG: CPT | Performed by: STUDENT IN AN ORGANIZED HEALTH CARE EDUCATION/TRAINING PROGRAM

## 2025-01-13 NOTE — PROGRESS NOTES
VANESSA 37w4d - visit #12    /-3 medium consistency, cervix is still posterior     +FM, no LOF, no VB  +Ctx every 15 to 20 minutes the past couple nights    Vitals:    25 0904   BP: 110/64   Pulse: 69   Weight: 160 lb 3.2 oz (72.7 kg)   Height: 67\"        TWG 39 lb 3.2 oz (17.8 kg)     29 year old  at 37w0d  MABEL 25 by LMP c/w  8w0d  US  O+/GBS neg     -NIPT neg, BOY  -carrier screen neg  -Anatomy normal  -Tdap, flu, RSV done  -3rd tri HIV & T pal done   - IOL scheduled 2025    Excessive weight gain  -pre preg BMI 19 - wt gain goal 25-35 lb. Up 40 lb  -Cautioned regarding increased risk of fetal macrosomia, birth injury for fetus and mother, need for  section, risk of  hypoglycemia.     Mild intermittent asthma  -Arnuity with albuterol PRN     Anxiety  -Continue sertraline  -Discussed  withdrawal potential at previous      Psoriasis  -Actually has gotten a lot better in pregnancy   -Ok to continue topical medications     RTC 1 wk

## 2025-01-17 NOTE — PROGRESS NOTES
28.1    Feeling well, +fm, no LOF, no VB, no ctx  A little bit more vaginal discharge         EDC by LMP c/w US  -NIPT neg  -carrier screen neg  - 20 wk US normal  -1h GTT, CBC   -Tdap today, RSV discussed       #normal wt (BMI 18.5-24.9)  [ ] recommended wt gain 25-35 lbs during preg    Mild intermittent asthma  Arnuity with albuterol PRN    Anxiety  Continue sertaline  Discussed  withdrawal potential     Psoriasis  Actually has gotten a lot better in pregnancy   Ok to continue topical medications             Occupational Therapy Discharge Summary    Reason for therapy discharge:    All goals and outcomes met, no further needs identified.    Progress towards therapy goal(s). See goals on Care Plan in [facility] electronic health record for goal details.  Goals met    Therapy recommendation(s):    No further therapy is recommended.  Advise OP OT for driving readiness if visual issues become worse or field cut becomes larger.

## 2025-01-20 ENCOUNTER — HOSPITAL ENCOUNTER (INPATIENT)
Facility: HOSPITAL | Age: 30
LOS: 2 days | Discharge: HOME | End: 2025-01-22
Attending: OBSTETRICS & GYNECOLOGY | Admitting: OBSTETRICS & GYNECOLOGY
Payer: COMMERCIAL

## 2025-01-20 PROBLEM — Z34.90 PREGNANCY (HCC): Status: ACTIVE | Noted: 2025-01-20

## 2025-01-20 LAB
ALBUMIN SERPL-MCNC: 3.6 G/DL (ref 3.2–4.8)
ALBUMIN/GLOB SERPL: 1.2 {RATIO} (ref 1–2)
ALP LIVER SERPL-CCNC: 175 U/L
ALT SERPL-CCNC: 25 U/L
ANION GAP SERPL CALC-SCNC: 9 MMOL/L (ref 0–18)
ANTIBODY SCREEN: NEGATIVE
AST SERPL-CCNC: 26 U/L (ref ?–34)
BASOPHILS # BLD AUTO: 0.03 X10(3) UL (ref 0–0.2)
BASOPHILS NFR BLD AUTO: 0.3 %
BILIRUB SERPL-MCNC: 0.2 MG/DL (ref 0.3–1.2)
BUN BLD-MCNC: 10 MG/DL (ref 9–23)
CALCIUM BLD-MCNC: 9.5 MG/DL (ref 8.7–10.6)
CHLORIDE SERPL-SCNC: 104 MMOL/L (ref 98–112)
CO2 SERPL-SCNC: 24 MMOL/L (ref 21–32)
CREAT BLD-MCNC: 0.58 MG/DL
EGFRCR SERPLBLD CKD-EPI 2021: 126 ML/MIN/1.73M2 (ref 60–?)
EOSINOPHIL # BLD AUTO: 0.04 X10(3) UL (ref 0–0.7)
EOSINOPHIL NFR BLD AUTO: 0.4 %
ERYTHROCYTE [DISTWIDTH] IN BLOOD BY AUTOMATED COUNT: 11.9 %
GLOBULIN PLAS-MCNC: 3.1 G/DL (ref 2–3.5)
GLUCOSE BLD-MCNC: 82 MG/DL (ref 70–99)
HCT VFR BLD AUTO: 37.2 %
HGB BLD-MCNC: 13.2 G/DL
IMM GRANULOCYTES # BLD AUTO: 0.05 X10(3) UL (ref 0–1)
IMM GRANULOCYTES NFR BLD: 0.5 %
LYMPHOCYTES # BLD AUTO: 1.7 X10(3) UL (ref 1–4)
LYMPHOCYTES NFR BLD AUTO: 18.3 %
MCH RBC QN AUTO: 33.5 PG (ref 26–34)
MCHC RBC AUTO-ENTMCNC: 35.5 G/DL (ref 31–37)
MCV RBC AUTO: 94.4 FL
MONOCYTES # BLD AUTO: 0.75 X10(3) UL (ref 0.1–1)
MONOCYTES NFR BLD AUTO: 8.1 %
NEUTROPHILS # BLD AUTO: 6.7 X10 (3) UL (ref 1.5–7.7)
NEUTROPHILS # BLD AUTO: 6.7 X10(3) UL (ref 1.5–7.7)
NEUTROPHILS NFR BLD AUTO: 72.4 %
OSMOLALITY SERPL CALC.SUM OF ELEC: 282 MOSM/KG (ref 275–295)
PLATELET # BLD AUTO: 216 10(3)UL (ref 150–450)
POTASSIUM SERPL-SCNC: 4 MMOL/L (ref 3.5–5.1)
PROT SERPL-MCNC: 6.7 G/DL (ref 5.7–8.2)
RBC # BLD AUTO: 3.94 X10(6)UL
RH BLOOD TYPE: POSITIVE
SODIUM SERPL-SCNC: 137 MMOL/L (ref 136–145)
URATE SERPL-MCNC: 3.3 MG/DL
WBC # BLD AUTO: 9.3 X10(3) UL (ref 4–11)

## 2025-01-20 RX ORDER — CITRIC ACID/SODIUM CITRATE 334-500MG
30 SOLUTION, ORAL ORAL AS NEEDED
Status: DISCONTINUED | OUTPATIENT
Start: 2025-01-20 | End: 2025-01-21

## 2025-01-20 RX ORDER — IBUPROFEN 600 MG/1
600 TABLET, FILM COATED ORAL ONCE AS NEEDED
Status: DISCONTINUED | OUTPATIENT
Start: 2025-01-20 | End: 2025-01-21

## 2025-01-20 RX ORDER — ACETAMINOPHEN 500 MG
500 TABLET ORAL EVERY 6 HOURS PRN
Status: DISCONTINUED | OUTPATIENT
Start: 2025-01-20 | End: 2025-01-21

## 2025-01-20 RX ORDER — TERBUTALINE SULFATE 1 MG/ML
0.25 INJECTION, SOLUTION SUBCUTANEOUS AS NEEDED
Status: DISCONTINUED | OUTPATIENT
Start: 2025-01-20 | End: 2025-01-21

## 2025-01-20 RX ORDER — ACETAMINOPHEN 500 MG
1000 TABLET ORAL EVERY 6 HOURS PRN
Status: DISCONTINUED | OUTPATIENT
Start: 2025-01-20 | End: 2025-01-21

## 2025-01-20 RX ORDER — SODIUM CHLORIDE, SODIUM LACTATE, POTASSIUM CHLORIDE, CALCIUM CHLORIDE 600; 310; 30; 20 MG/100ML; MG/100ML; MG/100ML; MG/100ML
INJECTION, SOLUTION INTRAVENOUS CONTINUOUS
Status: DISCONTINUED | OUTPATIENT
Start: 2025-01-20 | End: 2025-01-21

## 2025-01-20 RX ORDER — ONDANSETRON 2 MG/ML
4 INJECTION INTRAMUSCULAR; INTRAVENOUS EVERY 6 HOURS PRN
Status: DISCONTINUED | OUTPATIENT
Start: 2025-01-20 | End: 2025-01-21

## 2025-01-20 RX ORDER — DEXTROSE, SODIUM CHLORIDE, SODIUM LACTATE, POTASSIUM CHLORIDE, AND CALCIUM CHLORIDE 5; .6; .31; .03; .02 G/100ML; G/100ML; G/100ML; G/100ML; G/100ML
INJECTION, SOLUTION INTRAVENOUS AS NEEDED
Status: DISCONTINUED | OUTPATIENT
Start: 2025-01-20 | End: 2025-01-21

## 2025-01-21 PROBLEM — O13.3 GESTATIONAL HYPERTENSION, THIRD TRIMESTER (HCC): Status: ACTIVE | Noted: 2025-01-21

## 2025-01-21 LAB
CREAT UR-SCNC: 9 MG/DL
PROT UR-MCNC: <6 MG/DL (ref ?–14)
T PALLIDUM AB SER QL IA: NONREACTIVE

## 2025-01-21 PROCEDURE — 10907ZC DRAINAGE OF AMNIOTIC FLUID, THERAPEUTIC FROM PRODUCTS OF CONCEPTION, VIA NATURAL OR ARTIFICIAL OPENING: ICD-10-PCS | Performed by: OBSTETRICS & GYNECOLOGY

## 2025-01-21 PROCEDURE — 59400 OBSTETRICAL CARE: CPT | Performed by: OBSTETRICS & GYNECOLOGY

## 2025-01-21 PROCEDURE — 0HQ9XZZ REPAIR PERINEUM SKIN, EXTERNAL APPROACH: ICD-10-PCS | Performed by: OBSTETRICS & GYNECOLOGY

## 2025-01-21 RX ORDER — ACETAMINOPHEN 500 MG
500 TABLET ORAL EVERY 6 HOURS PRN
Status: DISCONTINUED | OUTPATIENT
Start: 2025-01-21 | End: 2025-01-22

## 2025-01-21 RX ORDER — ACETAMINOPHEN 500 MG
1000 TABLET ORAL EVERY 6 HOURS PRN
Status: DISCONTINUED | OUTPATIENT
Start: 2025-01-21 | End: 2025-01-22

## 2025-01-21 RX ORDER — SIMETHICONE 80 MG
80 TABLET,CHEWABLE ORAL 3 TIMES DAILY PRN
Status: DISCONTINUED | OUTPATIENT
Start: 2025-01-21 | End: 2025-01-22

## 2025-01-21 RX ORDER — AMMONIA 15 % (W/V)
0.3 AMPUL (EA) INHALATION AS NEEDED
Status: DISCONTINUED | OUTPATIENT
Start: 2025-01-21 | End: 2025-01-22

## 2025-01-21 RX ORDER — DOCUSATE SODIUM 100 MG/1
100 CAPSULE, LIQUID FILLED ORAL
Status: DISCONTINUED | OUTPATIENT
Start: 2025-01-21 | End: 2025-01-22

## 2025-01-21 RX ORDER — BISACODYL 10 MG
10 SUPPOSITORY, RECTAL RECTAL ONCE AS NEEDED
Status: DISCONTINUED | OUTPATIENT
Start: 2025-01-21 | End: 2025-01-22

## 2025-01-21 RX ORDER — IBUPROFEN 600 MG/1
600 TABLET, FILM COATED ORAL EVERY 6 HOURS
Status: DISCONTINUED | OUTPATIENT
Start: 2025-01-21 | End: 2025-01-22

## 2025-01-21 NOTE — PROGRESS NOTES
Received patient alert and oriented x4 in OB Triage #2 bathroom, color pink/skin warm and dry. Patient presents verbalizing a headache that started at 1800, went away returned at 2100 (each time headache occurred patient saw spots in lower left visual range).

## 2025-01-21 NOTE — PLAN OF CARE
Problem: BIRTH - VAGINAL/ SECTION  Goal: Fetal and maternal status remain reassuring during the birth process  Description: INTERVENTIONS:  - Monitor vital signs  - Monitor fetal heart rate  - Monitor uterine activity  - Monitor labor progression (vaginal delivery)  - DVT prophylaxis (C/S delivery)  - Surgical antibiotic prophylaxis (C/S delivery)  Outcome: Progressing     Problem: PAIN - ADULT  Goal: Verbalizes/displays adequate comfort level or patient's stated pain goal  Description: INTERVENTIONS:  - Encourage pt to monitor pain and request assistance  - Assess pain using appropriate pain scale  - Administer analgesics based on type and severity of pain and evaluate response  - Implement non-pharmacological measures as appropriate and evaluate response  - Consider cultural and social influences on pain and pain management  - Manage/alleviate anxiety  - Utilize distraction and/or relaxation techniques  - Monitor for opioid side effects  - Notify MD/LIP if interventions unsuccessful or patient reports new pain  - Anticipate increased pain with activity and pre-medicate as appropriate  Outcome: Progressing     Problem: ANXIETY  Goal: Will report anxiety at manageable levels  Description: INTERVENTIONS:  - Administer medication as ordered  - Teach and rehearse alternative coping skills  - Provide emotional support with 1:1 interaction with staff  Outcome: Progressing     Problem: Patient/Family Goals  Goal: Patient/Family Long Term Goal  Description: Patient's Long Term Goal: Pain management throughout hospital stay and post partum.     Interventions:  -   - See additional Care Plan goals for specific interventions  Outcome: Progressing  Goal: Patient/Family Short Term Goal  Description: Patient's Short Term Goal: Vaginal delivery of healthy male .     Interventions:   -   - See additional Care Plan goals for specific interventions  Outcome: Progressing

## 2025-01-21 NOTE — L&D DELIVERY NOTE
Michele Oro [[id number]]      Labor Events     labor?: No   steroids?: None  Antibiotics received during labor?: No  Rupture date/time: 2025 0516     Rupture type: AROM  Fluid color: Clear  Labor type: Spontaneous Onset of Labor  Augmentation: AROM  Intrapartum & labor complications: None       Labor Event Times    Labor onset date/time: 2025 0000  Dilation complete date/time: 2025  Start pushing date/time: 2025        Presentation    Presentation: Vertex       Operative Delivery    Operative Vaginal Delivery: No                Shoulder Dystocia    Shoulder Dystocia: No       Anesthesia    Method: Local               Delivery      Delivery date/time:  25 07:07:32   Delivery type: Normal spontaneous vaginal delivery    Details:     Delivery location: delivery room       Delivery Providers    Delivering Clinician: Zaida Bazan DO   Delivery personnel:  Provider Role   Annie Amin, RN Baby Nurse   Rena Samuel RN Delivery Nurse             Cord    Vessels: 3 Vessels  Complications: Nuchal, Body cord  # of loops: 3  Timed cord clamping: Yes  Time in sec: 60  Cord blood disposition: to lab  Gases sent?: No       Resuscitation    Method: None        Measurements      Weight: 3070 g 6 lb 12.3 oz Length: 47 cm     Head circum.: 31.5 cm Chest circum.: 32 cm      Abdominal circum.: 32 cm           Placenta    Date/time: 2025 0711  Removal: Spontaneous  Appearance: Intact  Disposition: held for future pathology       Apgars    Living status: Living   Apgar Scoring Key:    0 1 2    Skin color Blue or pale Acrocyanotic Completely pink    Heart rate Absent <100 bpm >100 bpm    Reflex irritability No response Grimace Cry or active withdrawal    Muscle tone Limp Some flexion Active motion    Respiratory effort Absent Weak cry; hypoventilation Good, crying              1 Minute:  5 Minute:  10 Minute:  15 Minute:  20 Minute:      Skin  color: 1  1       Heart rate: 2  2       Reflex irritablity: 2  2       Muscle tone: 2  2       Respiratory effort: 2  2       Total: 9  9          Apgars assigned by: ADITHYA   disposition: with mother       Skin to Skin    No data filed       Vaginal Count    Initial count RN: Rena Samuel RN  Initial count Tech: Cody Odonnelljared Sue   Sharps    Initial counts 11   0    Final counts               Lacerations    Episiotomy: None  Perineal lacerations: 1st Repaired?: Yes               29 y.o.  at 38w5d with  male infant, nuchal cord x3, delivered through. APGARs 9/9, weight [id number]. Body and shoulders easily delivered. Cord clamped and cut after 30 seconds.Placenta delivered spontaneously, intact. B/l labial and 1st degree perineal laceration repaired with 2-0 and 3-0 chromic. Good hemostasis,  ml

## 2025-01-21 NOTE — PROGRESS NOTES
To stretcher, verbalizes fetal movement.. EFM applied. Abdomen palpates soft and non-tender, denies pain or discomfort with palpation. No vaginal bleeding or leaking of fluid noted from introitus. See admission and flow sheet for further assessment. Patient is a  with an MABEL of 2025= 38 weeks 4 days.

## 2025-01-21 NOTE — PROGRESS NOTES
Phoned Dr. Bazan, informed her of patient presenting compliant, FHR, uterine activity, maternal vital signs and history. Admission orders obtained.

## 2025-01-21 NOTE — PROGRESS NOTES
Pt is a 29 year old female admitted to 114/114-A.     Chief Complaint   Patient presents with    Medical Complication     Headache onset at 1800 that went away onset of another headache at 2100. Each started with seeing spot in lower left visual range.       Pt is  38w4d intra-uterine pregnancy.  History obtained, consents signed. Oriented to room, staff, and plan of care.

## 2025-01-21 NOTE — H&P
[facility]  History & Physical    Shea Oro Patient Status:  Inpatient    1995 MRN [id number]   Location [facility] LABOR & DELIVERY Attending Zaida Bazan, DO   Hosp Day # 1 PCP Rambo Cordova MD     SUBJECTIVE:    Shea Oro is a 29 year old  female with EDC 25 at 38 and 5/7 weeks gestation who is being admitted for  gestational HTN and early labor .  She presented c/o cramping, new onset headache and floaters in her eyes. Her BP on admission noted t be elevated  .   Fetal Movement: normal.     Obstetric History:   OB History    Para Term  AB Living   1 0 0 0 0 0   SAB IAB Ectopic Multiple Live Births   0 0 0 0 0      # Outcome Date GA Lbr Benjy/2nd Weight Sex Type Anes PTL Lv   1 Current              Past Medical History:   Past Medical History:    Anxiety and depression    Migraine with aura    history of migraines pre-dating  pregnancy    Mild persistent asthma without complication (HCC)    Psoriasis    Screening for genetic disease carrier status    [employer] Carrier Screen = NEGATIVE FOR 14 OUT OF 14 DISEASES     Past Social History:   Past Surgical History:   Procedure Laterality Date    Hysteroscopy  2024    Hysteroscopic IUD removal - Dr. Nena Gonzalez Bilateral 2019    Tonsillectomy  2018    [place] teeth removed       Family History:   Family History   Problem Relation Age of Onset    Other ( labors) Mother     Diabetes Maternal Grandmother     Diabetes Maternal Grandfather     Diabetes Paternal Grandfather     No Known Problems Sister     No Known Problems Brother      Social History:   Social History     Tobacco Use    Smoking status: Never     Passive exposure: Never    Smokeless tobacco: Never   Substance Use Topics    Alcohol use: Not Currently       Home Meds: Prescriptions Prior to Admission[1]  Allergies: Allergies[2]    OBJECTIVE:    Temp:  [97.8 °F (36.6 °C)-98.2 °F (36.8 °C)] 98.2  °F (36.8 °C)  Pulse:  [60-77] 69  Resp:  [16-18] 16  BP: (129-143)/(83-91) 130/83  SpO2:  [97 %-98 %] 97 %    Lungs:   clear to auscultation bilaterally   Heart:   regular rate and rhythm, S1, S2 normal, no murmur, click, rub or gallop   Abdomen: FHT present   Fetal Surveillance:  145 BPM   Fetal heart variability: moderate      Cervix: dilation 2, effacement 80, and station -2     Lab Review:  O, Rh+, Rubella-immune, Hepatitis B surface antigen non-reactive, GBS negative  Recent Labs   Lab 01/20/25  2239   RBC 3.94   HGB 13.2   HCT 37.2   MCV 94.4   MCH 33.5   MCHC 35.5   RDW 11.9   NEPRELIM 6.70   WBC 9.3   .0       Recent Labs   Lab 01/20/25  2239   GLU 82   BUN 10   CREATSERUM 0.58   EGFRCR 126   CA 9.5   ALB 3.6      K 4.0      CO2 24.0   ALKPHO 175*   AST 26   ALT 25   BILT 0.2*   TP 6.7          ASSESSMENT:    38 and 5/7 weeks gestation.  Early latent labor.gestational HTN    PLAN:    Risks, benefits, alternatives and possible complications have been discussed in detail with the patient.  Pre-admission, admission, and post admission procedures and expectations were discussed in detail.  All questions answered, all appropriate consents will be signed at the Hospital. Admission is planned for today.   Intervention: expectant management.    Zaida Bazan DO  1/21/2025  2:50 AM       [1]   Medications Prior to Admission   Medication Sig Dispense Refill Last Dose/Taking    fluticasone furoate (ARNUITY ELLIPTA) 100 MCG/ACT Inhalation Aerosol Powder, Breath Activated Inhale 1 puff into the lungs daily. 3 each 3 1/20/2025 at  8:00 AM    prenatal vitamin with DHA 27-0.8-228 MG Oral Cap Take 1 capsule by mouth daily.   1/20/2025 at  8:00 AM    ferrous sulfate 325 (65 FE) MG Oral Tab EC Take 2 mg by mouth every other day.   1/19/2025 at  9:00 PM    sertraline 50 MG Oral Tab Take 1 tablet (50 mg total) by mouth daily. 90 tablet 3 1/20/2025 at  8:00 AM    albuterol 108 (90 Base) MCG/ACT Inhalation  Aero Soln Inhale 2 puffs into the lungs every 6 (six) hours as needed for Wheezing. 1 each 5 Unknown   [2] No Known Allergies

## 2025-01-21 NOTE — PLAN OF CARE

## 2025-01-22 VITALS
HEART RATE: 64 BPM | OXYGEN SATURATION: 97 % | TEMPERATURE: 98 F | SYSTOLIC BLOOD PRESSURE: 126 MMHG | HEIGHT: 67 IN | DIASTOLIC BLOOD PRESSURE: 87 MMHG | BODY MASS INDEX: 25.11 KG/M2 | WEIGHT: 160 LBS | RESPIRATION RATE: 18 BRPM

## 2025-01-22 PROBLEM — Z34.90 PREGNANCY (HCC): Status: RESOLVED | Noted: 2025-01-20 | Resolved: 2025-01-22

## 2025-01-22 LAB
BASOPHILS # BLD AUTO: 0.03 X10(3) UL (ref 0–0.2)
BASOPHILS NFR BLD AUTO: 0.4 %
EOSINOPHIL # BLD AUTO: 0.03 X10(3) UL (ref 0–0.7)
EOSINOPHIL NFR BLD AUTO: 0.4 %
ERYTHROCYTE [DISTWIDTH] IN BLOOD BY AUTOMATED COUNT: 12.3 %
HCT VFR BLD AUTO: 32.6 %
HGB BLD-MCNC: 11.4 G/DL
IMM GRANULOCYTES # BLD AUTO: 0.03 X10(3) UL (ref 0–1)
IMM GRANULOCYTES NFR BLD: 0.4 %
LYMPHOCYTES # BLD AUTO: 1.36 X10(3) UL (ref 1–4)
LYMPHOCYTES NFR BLD AUTO: 19.2 %
MCH RBC QN AUTO: 33.3 PG (ref 26–34)
MCHC RBC AUTO-ENTMCNC: 35 G/DL (ref 31–37)
MCV RBC AUTO: 95.3 FL
MONOCYTES # BLD AUTO: 0.72 X10(3) UL (ref 0.1–1)
MONOCYTES NFR BLD AUTO: 10.2 %
NEUTROPHILS # BLD AUTO: 4.9 X10 (3) UL (ref 1.5–7.7)
NEUTROPHILS # BLD AUTO: 4.9 X10(3) UL (ref 1.5–7.7)
NEUTROPHILS NFR BLD AUTO: 69.4 %
PLATELET # BLD AUTO: 141 10(3)UL (ref 150–450)
RBC # BLD AUTO: 3.42 X10(6)UL
WBC # BLD AUTO: 7.1 X10(3) UL (ref 4–11)

## 2025-01-22 NOTE — DISCHARGE INSTRUCTIONS
For pain: take tylenol 1000 mg every 6 hours as needed, ibuprofen 600 mg every 6 hours as needed    For constipation: take colace (docusate) twice daily as needed. Can also take Miralax or Milk of Magnesia nightly as needed (over the counter)    Nothing in the vagina for 6 weeks.     Call office for fever 100.4 or higher, increased vaginal bleeding soaking greater than 1 pad per hour, increased abdominal/pelvic pain, shortness of breath, chest pain, leg pain or swelling, persistent or severe headache, vision changes, persistent or severe upper abdominal pain, feeling depressed or extremely anxious, thoughts of self harm or harming infant(s).    Call office to schedule postpartum visit in 6 weeks.   Please call with other questions or concerns.    
None

## 2025-01-22 NOTE — PROGRESS NOTES
PPD#1    Pt has no complaints, lochia minimal. Tolerating PO, voiding without difficulty.    Vitals:    25   BP: 124/83   Pulse: 64   Resp: 18   Temp: 97.8 °F (36.6 °C)     Recent Labs   Lab 259 25  0649   RBC 3.94 3.42*   HGB 13.2 11.4*   HCT 37.2 32.6*   MCV 94.4 95.3   MCH 33.5 33.3   MCHC 35.5 35.0   RDW 11.9 12.3   NEPRELIM 6.70 4.90   WBC 9.3 7.1   .0 141.0*         Gen: NAD, appears well  Uterus: firm, below umbilicus    29 year old  now PPD#1 s/p  on 25AM  - AFVSS  - doing well  - Rh pos  - pt interested in discharge today. Discussed discharge instructions with pt and plan for follow up in 6 weeks

## 2025-01-23 ENCOUNTER — TELEPHONE (OUTPATIENT)
Dept: FAMILY MEDICINE CLINIC | Facility: CLINIC | Age: 30
End: 2025-01-23

## 2025-01-23 ENCOUNTER — PATIENT OUTREACH (OUTPATIENT)
Dept: CASE MANAGEMENT | Age: 30
End: 2025-01-23

## 2025-01-23 NOTE — PROGRESS NOTES
Voicemail received; Patient returning call  Called patient back    OBGYN Post Partum appointment request (delivered 01/21)     Dr Zaida Bazan  [address] [place] [address] [id number] [phone]  Follow up 6 weeks  Apt made:  Tue 03/04 @10:30am  Confirmed w/pt  Closing encounter

## 2025-01-23 NOTE — PROGRESS NOTES
OBGYN Post Partum appointment request (delivered 01/21)    Dr Zaida Bazan  [address] [place] [id number] [phone]  Follow up 6 weeks    Attempt #1:  Left message on voicemail for patient to call transitions specialist back to schedule follow up appointments. Provided Transitions specialist scheduling phone number [phone].

## 2025-01-23 NOTE — TELEPHONE ENCOUNTER
LM to call us, patient needs to go on the following website:  [email or web address]     Then she requests a PCP removal.  Dr. Beck is listed as \"attributed provider\" for some reason.     The patient must go on the above website to have this done.

## 2025-01-24 ENCOUNTER — TELEPHONE (OUTPATIENT)
Dept: OBGYN UNIT | Facility: HOSPITAL | Age: 30
End: 2025-01-24

## 2025-01-27 ENCOUNTER — TELEPHONE (OUTPATIENT)
Facility: CLINIC | Age: 30
End: 2025-01-27

## 2025-01-27 NOTE — TELEPHONE ENCOUNTER
[facility] referral placed. Referral # [id number]

## 2025-01-27 NOTE — TELEPHONE ENCOUNTER
Spoke with patient. Delivered 1/21/25. She would like a referral to see the lactation consultants to make sure baby is getting enough breast milk. Aware I will place the referral and call once approved. Verbalized understanding.

## 2025-02-03 NOTE — TELEPHONE ENCOUNTER
Message left per HIPAA form letting patient know her referral has been approved. To call with any questions.

## 2025-02-04 ENCOUNTER — TELEPHONE (OUTPATIENT)
Dept: LACTATION | Facility: HOSPITAL | Age: 30
End: 2025-02-04

## 2025-02-05 ENCOUNTER — LACTATION ENCOUNTER (OUTPATIENT)
Dept: LACTATION | Facility: HOSPITAL | Age: 30
End: 2025-02-05

## 2025-02-05 ENCOUNTER — NURSE ONLY (OUTPATIENT)
Dept: LACTATION | Facility: HOSPITAL | Age: 30
End: 2025-02-05
Attending: OBSTETRICS & GYNECOLOGY
Payer: COMMERCIAL

## 2025-02-05 VITALS — TEMPERATURE: 98 F

## 2025-02-05 PROCEDURE — 99212 OFFICE O/P EST SF 10 MIN: CPT

## 2025-02-05 NOTE — PATIENT INSTRUCTIONS
Yesenia weighed 7 lbs 8.6 oz today. He latches well, but comes on and off the breast with your fast let down. He transferred 88 ml of breast milk from your Left breast. He became fussy after spitting up after breastfeeding. He transferred 4 ml of breast milk from your Right breast and became disinterested in breastfeeding and then settled.  Try to burp Purvis frequently and keep Purvis upright for 30 minutes after Breastfeeding    I am recommending Yesenia be seen by a speech/myofunctional therapist for a sucking evaluation, and encourage you to call your pediatrician and discuss this recommendation.    Try to avoid dairy products and see if this helps Yesenia become less fussy    Feeding Plan    Provide skin to skin care for your baby. Use a breastfeeding journal and monitor for adequate wet diapers and stools  (Your baby should have at least 6-8 wet diapers and at least 3-4 soft, yellow seedy stools every 24 hours)  Breastfeed your baby on demand and wake by 2-3 hours for breastfeeding.  Provide a supplement of 1-2 oz or more of Expressed Breast Milk or Formula if your baby is having difficulty latching, too sleepy to feed, or not having adequate wet diapers and stools.   Use a breast pump for 10-15 minutes after breastfeeding when supplements are needed. Avoid an oversupply of breast milk    Follow-up:  Keep your appointment with your pediatrician as planned  Call your pediatrician sooner if any feeding difficulties or concerns develop  Call to schedule a Weight Check if your baby's wet or stool diapers decrease. Have your baby's weight checked again within 1-3 days of decreasing/stopping supplements.  Call the [facility] at [phone] with any breastfeeding/pumping questions as needed      Breastfeeding suggestions when supplements may be needed     Kangaroo mother care: Snuggle your baby between your breasts in just a diaper and covered with a blanket. Helps to wake a sleepy baby and increases your milk  supply.      Massage your breasts before nursing or pumping.     Breastfeed with hunger cues, most babies will breastfeed 8-12 times every 24 hours with some cluster feeding, especially during growth spurts. Gently wake by 2-3 hours to feed if sleepy.     Positioning:   Your hand at neck/shoulders, not the back of head.   Line chin with the bottom of your areola     Latching on:  Express drops of milk onto your baby's lips to encourage licking.  Point your nipple to baby's nose  Stroke nipple lightly down center of lips  Wait for wide mouth with tongue cupped at bottom of mouth.  Chin should be deep into breast, with some room between nose and the breast.   If needed, gently draw chin down lower to deepen latch.     Is baby taking enough breastmilk?  Swallowing with most sucks (every 1-3 sucks)  Compressing the breast when your baby sucks can increase milk flow.  At least 6-8 wet diapers and at least 3-4 soft, yellow seedy stools every 24 hours. Use a breastfeeding journal.  Weight gain of at least 4-7 ounces per week     Supplementation:     If not meeting these guidelines for adequate breastfeeding, feed 1-2 oz or more expressed milk or formula with a wide based, slow flow nipple.     Paced bottle feeding using a slow flow nipple:   Hold your baby in an upright position, supporting the hand and neck with your hand, rather than in the crook of your arm.   Let you baby \"latch on\" to bottle: stroke nipple down from top lip to bottom, licking is good, wait for wide mouth, tongue cupped at bottom of mouth.  Tip the bottle up just far enough that there is not air in the nipple.  Pausing mimics breastfeeding and discourages \"guzzling\" the feeding, allowing infant to take at least 15 minutes to drink the breastmilk or formula.     Prevent plugged ducts and mastitis  Watch for signs of breast infection (mastitis) - painful breast, reddened area, fever, chills or flu-like symptoms - call your OB doctor at once if this  occurs.     For additional information:   La Leche League website-www.llli.org  www.[employer]  www.breastfeeding.org  www.breastfeedingonline.com

## 2025-02-05 NOTE — LACTATION NOTE
LACTATION NOTE - MOTHER      Evaluation Type: Outpatient Initial    Problems identified  Problems identified: Knowledge deficit;Milk supply not WNL  Milk supply not WNL: Hypergalactia;Forceful GIDEON  Problems Identified Other: Shea came to the [facility] center with her spouse and 2 wk old infant, Yesenia, for Breastfeeding support. Infant is ganining weight, breastfeeding well, but is very gassy and fussy. Shea wanted to ensure infant had a good latch and had questions about reflux    Maternal history  Maternal history: Anemia;Depression  Other/comment: Infant born at 38 5/7 weeks gestation.  Infant has been exclusively BF.  Pt is on Zoloft Hx of Gestational HTN, no medication for elevated blood pressures, Mild presistent asthma- takes a steroid inhaler (L3), Migraines with aura, Psoriasis    Breastfeeding goal  Breastfeeding goal: To maintain breast milk feeding per patient goal    Maternal Assessment  Bilateral Breasts: Full;Asymmetrical (Left breast larger than Right breast)  Bilateral Nipples: Everted (Breast milk easily express)  Prior breastfeeding experience (comment below): Primip  Breastfeeding Assistance: Breast exam provided with permission;Hand expression provided with permission;Breastfeeding assistance provided with permission;Pumping assistance provided with permission    Pain assessment  Pain scale comment: Denies nipple soreness  Treatment of Sore Nipples: Deeper latch techniques;Expressed breast milk    Guidelines for use of:  Breast pump type: Spectra  Current use of pump:: Has not pumped yet  Suggested use of pump: For comfort as needed;Avoid overstimulation of milk supply  Post-feed pumped volume: 75 ml (45 ml from the left breast and 30 ml from the right breast)  Other (comment): Pt has been exclusively BF, reported that infant has been fussy the past 5 days. Infant was seen by MD and doctor recommended trying probiotic and mylicon drops. Also discussed the possibility of pt going dairy  free to see if this helps infant to be less fussy. Pt was able to latch infant independently, infant came off breast a couple of times with pt's fast let down, but infant latched back on. Infant transferred 88 ml from L breast in 12 mins and seemed satisfied. Infant spit up with burp and became fussy afterwards. Offered 2nd breast and infant transferred 4 ml in a few minutes and was disinterested in continuing BF. Instructed pt on use of her new spectra breast pump. Reviewed proper flange fit and LC recommended 19/21 mm flanges. Reviewed adjusting pump strength to her comfort level. Pt pumped 75 ml in 10 mins. Enc only to pump for her comfort level at this time. Pt's L breast is larger than her R breast. Pt has a larger milk supply from her L breast also. Discussed ways to try to increase the supply on pt's R breast, while avoiding an oversupply. Pt is considering trying to avoid dairy to see if it makes a difference in infants fussy/spitty behaviors. A handout on dairy free diet was given to pt. DIANE discussed that infant has signs he may have some oral restrictions which may be contributing to gassy symptoms. Discussed the recommendation for infant to be seen by a speech/myofunctional therapist for a consult. A handout on tongue tie was given to pt and enc parents to discuss this information with infant's doctor and obtain a referral prn. Pt is taking a steroid inhaler, Arnity Ellipta, (L3) for asthma, and Zoloft (L2) for anxiety and depression, a handout from [employer], Medications in Mother's Milk was discussed with pt and enc to discuss this with her pediatrician. Pt's questions were answered and enc to follow up with LC prn

## 2025-02-06 NOTE — LACTATION NOTE
This note was copied from a baby's chart.  LACTATION NOTE - INFANT    Evaluation Type  Evaluation Type: Outpatient Initial    Problems & Assessment  Problems Diagnosed or Identified: Reflux symptoms;37-38 weeks gestation  Problems: comment/detail: Yesenia was a VD at 38.5 wks GA. He had a rapid second stage of labor and had NCx3 with a body cord. He weighed 6 lbs 12.3 oz at birth and weighed 7 lbs 8.6 oz today at 2 weeks old. He is exclusively breastfeeding and has past his birth weight. Mother, Shea, came to the [facility] to ensure infant's latch and had questions about reflux  Infant Assessment: Anterior fontanel soft and flat;Abdomen soft, non-distended;Bowel sounds active;Hunger cues present;Oral mucous membranes moist;Skin color: pink or appropriate for ethnicity (Sucking blisters, thick upper lip frenulum)  Muscle tone: Appropriate for GA    Feeding Assessment  Summary Current Feeding: Breastfeeding exclusively  Last 24 hour feeding summary: BF 12  Breastfeeding Assessment: Deep latch achieved and observed;Coordinated suck/swallow;Cough with milk ejection reflex;Sustained nutrititive latch w/audible swallows  Breastfeeding lasted # of minutes: 15  Breastfeeding Positions: left breast;right breast;cross cradle  Latch: Grasps breast, tongue down, lips flanged, rhythmic sucking  Audible Sucks/Swallows: Spontaneous and intermittent (24 hours old)  Type of Nipple: Everted (after stimulation)  Comfort (Breast/Nipple): Soft/non-tender  Hold (Positioning): No assist from staff, mother able to position/hold infant  LATCH Score: 10  Other (comment): Mother has been exclusively BF infant and reported that infant has been fussy the past 5 days. Infant was seen by MD and doctor recommended trying probiotic and mylicon drops. Also discussed the possibility of mother going dairy free to see if this helps infant to be less fussy. Mother was able to latch infant independently, infant came off breast a  couple of times with mother's fast let down, but infant latched back on. Infant transferred 88 ml from L breast in 12 mins and seemed satisfied. Infant spit up with burp and became fussy afterwards. Offered 2nd breast and infant transferred 4 ml in a few minutes and was disinterested in continuing BF. Mother is considering trying to avoid dairy to see if it makes a difference in infants fussy/spitty behaviors. A handout on dairy free diet was given to pt. LC discussed that infant has signs he may have some oral restrictions which may be contributing to gassy symptoms. Discussed the recommendation for infant to be seen by a speech/myofunctional therapist for a consult. A handout on tongue tie was given to parents and enc parents to discuss this information with infant's doctor and obtain a referral prn. Mother is taking a steroid inhaler, Arnity Ellipta (L3) for asthma, and Zoloft (L2) for anxiety and depression. A handout from [employer], Medications in Mother's Milk was discussed with mother and enc to discuss these medications with infant's doctor. Enc to follow up with LC prn    Output  # Voids in 24 hours: 10  # Stools in 24 hours: 8    Pre/Post Weights  Pre-Weight Right Breast (g): 3518  Post-Weight Right Breast (g): 3522 (after spit up)  ml of milk, RT Brst: 4  Pre-Weight Left Breast (g): 3430  Post-Weight Left Breast (g): 3518  ml of milk, LT Brst: 88  ml of milk, total: 92  Supplement total, ml: 0  Feeding total ml: 92

## 2025-03-04 ENCOUNTER — POSTPARTUM (OUTPATIENT)
Facility: CLINIC | Age: 30
End: 2025-03-04
Payer: COMMERCIAL

## 2025-03-04 VITALS
HEART RATE: 67 BPM | BODY MASS INDEX: 22.35 KG/M2 | HEIGHT: 67 IN | DIASTOLIC BLOOD PRESSURE: 62 MMHG | SYSTOLIC BLOOD PRESSURE: 114 MMHG | WEIGHT: 142.38 LBS

## 2025-03-04 DIAGNOSIS — N94.10 DYSPAREUNIA, FEMALE: ICD-10-CM

## 2025-03-04 PROCEDURE — 3008F BODY MASS INDEX DOCD: CPT | Performed by: OBSTETRICS & GYNECOLOGY

## 2025-03-04 PROCEDURE — 3074F SYST BP LT 130 MM HG: CPT | Performed by: OBSTETRICS & GYNECOLOGY

## 2025-03-04 PROCEDURE — 3078F DIAST BP <80 MM HG: CPT | Performed by: OBSTETRICS & GYNECOLOGY

## 2025-03-04 NOTE — PROGRESS NOTES
HPI    Shea Oor is a 29 year old female  here for 6 week post-partum visit.  Patient delivered a  male infant on 25 via .  Patient desires IUD for contraception.  Patient is breast feeding.   Patient denies symptoms of depression, [place]  depression scale score of 0 out of 30. She c/o discomfort at perineum    [place]  DEPRESSION SCALE       OBSTETRIC HISTORY  OB History    Para Term  AB Living   1 1 1 0 0 1   SAB IAB Ectopic Multiple Live Births   0 0 0 0 1      # Outcome Date GA Lbr Benjy/2nd Weight Sex Type Anes PTL Lv   1 Term 25 38w5d 06:36 / 00:31 6 lb 12.3 oz (3.07 kg) M NORMAL SPONT Local N ESTHER       GYNE HISTORY        MEDICATIONS:    Current Outpatient Medications:     fluticasone furoate (ARNUITY ELLIPTA) 100 MCG/ACT Inhalation Aerosol Powder, Breath Activated, Inhale 1 puff into the lungs daily., Disp: 3 each, Rfl: 3    prenatal vitamin with DHA 27-0.8-228 MG Oral Cap, Take 1 capsule by mouth daily., Disp: , Rfl:     ferrous sulfate 325 (65 FE) MG Oral Tab EC, Take 2 mg by mouth every other day., Disp: , Rfl:     sertraline 50 MG Oral Tab, Take 1 tablet (50 mg total) by mouth daily., Disp: 90 tablet, Rfl: 3    albuterol 108 (90 Base) MCG/ACT Inhalation Aero Soln, Inhale 2 puffs into the lungs every 6 (six) hours as needed for Wheezing., Disp: 1 each, Rfl: 5    ALLERGIES:  Allergies[1]    PHYSICAL EXAM  Blood pressure 114/62, pulse 67, height 67\", weight 142 lb 6.4 oz (64.6 kg), last menstrual period 2024, currently breastfeeding.  General:  Well nourished, well developed woman in no acute distress  Abdomen:  soft, nontender, no masses  External Genitalia: normal appearance, hair distribution, and no lesions  Vagina:  Normal appearance without lesions, no abnormal discharge  Cervix:  Normal without tenderness on motion  Uterus: normal in size, contour, position, mobility, without tenderness  Adnexa: normal without masses  or tenderness  Perineum: well healed perineum  Anus: no hemorroids       ASSESSMENT/PLAN  Shea was seen today for postpartum care and other.    Diagnoses and all orders for this visit:    Postpartum exam (HCC)      Discussed all options of birthcontrol including ocps, minipill, Mirena or Paragard IUD, nuvaring, orthoevra patch, nexplanon, Depoprovera, condoms or tubal sterilization options. Patient has chosen IUD - Paragard.  Patient to return for annual gyne exam in August.         [1] No Known Allergies

## 2025-03-11 ENCOUNTER — OFFICE VISIT (OUTPATIENT)
Dept: FAMILY MEDICINE CLINIC | Facility: CLINIC | Age: 30
End: 2025-03-11
Payer: COMMERCIAL

## 2025-03-11 VITALS
HEIGHT: 67 IN | OXYGEN SATURATION: 96 % | DIASTOLIC BLOOD PRESSURE: 64 MMHG | TEMPERATURE: 97 F | SYSTOLIC BLOOD PRESSURE: 90 MMHG | HEART RATE: 78 BPM | BODY MASS INDEX: 22.29 KG/M2 | WEIGHT: 142 LBS

## 2025-03-11 DIAGNOSIS — F32.A ANXIETY AND DEPRESSION: ICD-10-CM

## 2025-03-11 DIAGNOSIS — L40.9 PSORIASIS: ICD-10-CM

## 2025-03-11 DIAGNOSIS — Z23 NEED FOR VACCINATION: ICD-10-CM

## 2025-03-11 DIAGNOSIS — F41.9 ANXIETY AND DEPRESSION: ICD-10-CM

## 2025-03-11 DIAGNOSIS — N89.8 VAGINAL DISCHARGE: ICD-10-CM

## 2025-03-11 DIAGNOSIS — J45.40 MODERATE PERSISTENT ASTHMA WITHOUT COMPLICATION (HCC): ICD-10-CM

## 2025-03-11 DIAGNOSIS — Z00.00 WELL WOMAN EXAM WITHOUT GYNECOLOGICAL EXAM: Primary | ICD-10-CM

## 2025-03-11 PROCEDURE — 81514 NFCT DS BV&VAGINITIS DNA ALG: CPT | Performed by: FAMILY MEDICINE

## 2025-03-11 PROCEDURE — 99385 PREV VISIT NEW AGE 18-39: CPT | Performed by: FAMILY MEDICINE

## 2025-03-11 PROCEDURE — 3074F SYST BP LT 130 MM HG: CPT | Performed by: FAMILY MEDICINE

## 2025-03-11 PROCEDURE — 90471 IMMUNIZATION ADMIN: CPT | Performed by: FAMILY MEDICINE

## 2025-03-11 PROCEDURE — 99203 OFFICE O/P NEW LOW 30 MIN: CPT | Performed by: FAMILY MEDICINE

## 2025-03-11 PROCEDURE — 3078F DIAST BP <80 MM HG: CPT | Performed by: FAMILY MEDICINE

## 2025-03-11 PROCEDURE — 90677 PCV20 VACCINE IM: CPT | Performed by: FAMILY MEDICINE

## 2025-03-11 PROCEDURE — 3008F BODY MASS INDEX DOCD: CPT | Performed by: FAMILY MEDICINE

## 2025-03-11 RX ORDER — ALCLOMETASONE DIPROPIONATE 0.5 MG/G
1 OINTMENT TOPICAL 2 TIMES DAILY
Qty: 15 G | Refills: 0 | Status: SHIPPED | OUTPATIENT
Start: 2025-03-11

## 2025-03-11 NOTE — PROGRESS NOTES
The following individual(s) verbally consented to be recorded using ambient AI listening technology and understand that they can each withdraw their consent to this listening technology at any point by asking the clinician to turn off or pause the recording:    Patient name: Shea Oor

## 2025-03-11 NOTE — PROGRESS NOTES
SUBJECTIVE:  Chief Complaint   Patient presents with    Physical     No Pap sees gyne.  Establish care, referral to derm for psoriasis     HPI:    History of Present Illness  The patient presents for an annual physical exam.    She recently experienced vaginal dryness and irritation postpartum, with no significant discharge but some itching. She is considering using vaginal moisturizers to alleviate the discomfort.    Her history of psoriasis improved during pregnancy but has started to return postpartum. She requires a referral to a dermatologist specializing in psoriasis and needs a refill for her psoriasis medication, specifically a solution for her scalp and ears, and a lower potency steroid for new symptoms around her eyes.    Her asthma is well-controlled with maintenance medication, and she uses an inhaler regularly without any recent exacerbations.    She has been diagnosed with depression and anxiety, for which she is taking sertraline. The medication is working well without any issues.    She works at [employer] and plans to return to work on April 14th, initially on a PRN basis to achieve a better work-life balance. She has never smoked, does not consume alcohol currently, and does not use vaping or marijuana.      Chronic conditions:  Asthma: On arnuity  Psoriasis: Needs referral to derm.   MDD, CONCHIS: On sertraline    Health Maintenance:  Vaccines: reviewed as below. Indicated today: none  Immunization History   Administered Date(s) Administered    Covid-19 Vaccine Pfizer Bivalent 30mcg/0.3mL 11/03/2022    FLUZONE 6 months and older PFS 0.5 ml (19286) 11/03/2022    Influenza 02/01/2017, 10/20/2023    Meningococcal-Menactra 07/26/2013    RSV, bivalent, diluent reconstituted PF (Abrysvo) 12/06/2024    TDAP 06/07/2019, 11/08/2024    Tb Intradermal Test 10/12/2021, 12/10/2021   Pended Date(s) Pended    Pneumococcal Conjugate PCV20 03/11/2025     Obesity screening: Body mass index is 22.24  kg/m².  Diabetes screening:  neg  Hypercholesterolemia screening:   Lab Results   Component Value Date    HDL 54 2023    HDL 57 2022     Lab Results   Component Value Date    LDL 58 2023    LDL 73 2022     Lab Results   Component Value Date    TRIG 77 2023    TRIG 101 2022        Depression screen:   Depression Screening (PHQ-2/PHQ-9): Over the LAST 2 WEEKS   Little interest or pleasure in doing things: Not at all    Feeling down, depressed, or hopeless: Not at all    PHQ-2 SCORE: 0    Osteoporosis: No history of pathologic fractures. No steroid use, smoking, risk of falls, excessive alcohol use.  Cervical Cancer screening: Last Pap: 2022   Colon Cancer screening: Family history of colon cancer? no  Breast Cancer screening: Family history of breast cancer? no   STI: Desires testing for STIs? no  Tobacco use:  reports that she has never smoked. She has never been exposed to tobacco smoke. She has never used smokeless tobacco.    ROS: Negative unless stated above    HISTORY:  Past Medical History:    Anxiety and depression    Migraine with aura    history of migraines pre-dating  pregnancy    Mild persistent asthma without complication (HCC)    Psoriasis    Screening for genetic disease carrier status    [employer] Screen = NEGATIVE FOR 14 OUT OF 14 DISEASES      Past Surgical History:   Procedure Laterality Date    Hysteroscopy  2024    Hysteroscopic IUD removal - Dr. Nena Gonzalez Bilateral 2019    Tonsillectomy  2018    [place] teeth removed        Family History   Problem Relation Age of Onset    Other ( labors) Mother     High Cholesterol Mother     No Known Problems Sister     No Known Problems Brother     Diabetes Maternal Grandmother     Diabetes Maternal Grandfather     Diabetes Paternal Grandfather       Social History     Socioeconomic History    Marital status:     Number of children: 1   Tobacco Use    Smoking status: Never      Passive exposure: Never    Smokeless tobacco: Never   Vaping Use    Vaping status: Never Used   Substance and Sexual Activity    Alcohol use: Not Currently    Drug use: Never    Sexual activity: Not Currently     Partners: Male     Social Drivers of Health     Food Insecurity: No Food Insecurity (3/11/2025)    NCSS - Food Insecurity     Worried About Running Out of Food in the Last Year: No     Ran Out of Food in the Last Year: No   Transportation Needs: No Transportation Needs (3/11/2025)    NCSS - Transportation     Lack of Transportation: No   Stress: No Stress Concern Present (1/20/2025)    Stress     Feeling of Stress : No   Housing Stability: Not At Risk (3/11/2025)    NCSS - Housing/Utilities     Has Housing: Yes     Worried About Losing Housing: No     Unable to Get Utilities: No        Allergies:  Allergies[1]    OBJECTIVE:  PHYSICAL EXAM:  Vitals:    03/11/25 1105   BP: 90/64   BP Location: Right arm   Patient Position: Sitting   Cuff Size: large   Pulse: 78   Temp: 97.2 °F (36.2 °C)   TempSrc: Oral   SpO2: 96%   Weight: 142 lb (64.4 kg)   Height: 5' 7\" (1.702 m)     Physical Examination: General appearance - alert, well appearing, and in no distress and normal appearing weight  Mental status - alert, oriented to person, place, and time, normal mood, behavior, speech, dress, motor activity, and thought processes  Ears - bilateral TM's and external ear canals normal  Neck - supple, no significant adenopathy  Chest - clear to auscultation, no wheezes, rales or rhonchi, symmetric air entry  Heart - normal rate, regular rhythm, normal S1, S2, no murmurs, rubs, clicks or gallops  Abdomen - soft, nontender, nondistended, no masses or organomegaly  Extremities - peripheral pulses normal, no pedal edema, no clubbing or cyanosis    ASSESSMENT & PLAN:  Shea Oro is a 29 year old female is here for Physical (No Pap sees gyne.  Establish care, referral to derm for psoriasis)    Problem List  Items Addressed This Visit          Mental Health    Anxiety and depression       Skin    Psoriasis    Relevant Medications    Alclometasone Dipropionate 0.05 % External Ointment    Other Relevant Orders    Derm Referral - In Network     Other Visit Diagnoses       Well woman exam without gynecological exam    -  Primary    Need for vaccination        Relevant Orders    Immunization Admin Counseling, 1st Component, 18 years and older    PCV20 (Prevnar 20)    Vaginal discharge        Relevant Orders    Vaginitis Vaginosis PCR Panel    Moderate persistent asthma without complication (HCC)              Shea was seen today for physical.    Diagnoses and all orders for this visit:    Well woman exam without gynecological exam  Routine health maintenance reviewed, discussed and updated as below. Healthy diet and exercise reviewed.     Psoriasis  -     Derm Referral - In Network  -     Alclometasone Dipropionate 0.05 % External Ointment; Apply 1 Application topically 2 (two) times daily.    Need for vaccination  -     Immunization Admin Counseling, 1st Component, 18 years and older  -     PCV20 (Prevnar 20)    Vaginal discharge  -     Vaginitis Vaginosis PCR Panel; Future    Anxiety and depression    Moderate persistent asthma without complication (HCC)    Assessment & Plan  Vaginal Dryness  Postpartum vaginal dryness causing daily discomfort and irritation. Differential includes yeast infection due to mild itchiness.  - Perform vaginal swab to rule out yeast infection  - Use vaginal moisturizers or lubricants for daily comfort  - Consider topical estrogen if moisturizers are ineffective  - If swab indicates infection, initiate appropriate treatment    Psoriasis  Psoriasis with recent postpartum flare-up, affecting scalp, ears, and new involvement around the eyes. Previously managed with fluocinonide drops for scalp and ears, and triamcinolone for other areas. Requires dermatology referral for specialized care.  Discussed using a lower potency steroid for the eyes due to thin skin in that area.  - Refer to dermatologist for specialized psoriasis management  - Refill fluocinonide drops for scalp and ears  - Prescribe alclometasone ointment for use around the eyes  - Instruct to use alclometasone for less than two weeks to avoid skin thinning    Asthma  Asthma is well-controlled with maintenance medication. No recent exacerbations reported. Discussed the pneumonia vaccine due to asthma, which decreases the risk of hospitalization with bacterial pneumonia.  - Administer pneumonia vaccine  -Cont Arnuity    Depression and Anxiety  Depression and anxiety are well-managed with sertraline. Cont current dose.     Follow-up  Routine follow-up and management of ongoing health issues.  - Follow up with results of vaginal swab  - Ensure dermatology referral is completed  - Monitor response to new psoriasis treatment regimen        Call or return to clinic prn if these symptoms worsen or fail to improve as anticipated. RTC in 1 year.   Saira Muñoz DO  3/11/2025 11:20 AM    Note to Patient  The 21st Century Cures Act makes medical notes like these available to patients in the interest of transparency. However, be advised this is a medical document and is intended as [id number]-suqs communication; it is written in medical language and may appear blunt, direct, or contain abbreviations or verbiage that are unfamiliar. Medical documents are intended to carry relevant information, facts as evident, and the clinical opinion of the practitioner.     This report has been produced using speech recognition software, and may contain errors related to grammar, punctuation, spelling, words or phrases unrecognized or not translated appropriately to text; these errors may be referred to the dictating provider for further clarification and/or addendum as needed.         [1] No Known Allergies

## 2025-03-12 ENCOUNTER — TELEPHONE (OUTPATIENT)
Dept: FAMILY MEDICINE CLINIC | Facility: CLINIC | Age: 30
End: 2025-03-12

## 2025-03-12 NOTE — TELEPHONE ENCOUNTER
Erik called from [facility] stating that Pt.'s Vaginitis Vaginosis swab is  and needs to be re taken

## 2025-03-14 NOTE — TELEPHONE ENCOUNTER
Yes- she can do a self swab in the office and we can send off.  Please check our swabs to confirm they are not .   Saira Muñoz, DO

## 2025-03-17 NOTE — TELEPHONE ENCOUNTER
Spoke w/pt and she feels as though the symptoms have cleared. She is asking if she really needs to come in and do this if symptoms have cleared? Please advise.

## 2025-03-21 DIAGNOSIS — F32.A ANXIETY AND DEPRESSION: ICD-10-CM

## 2025-03-21 DIAGNOSIS — F41.9 ANXIETY AND DEPRESSION: ICD-10-CM

## 2025-03-21 NOTE — TELEPHONE ENCOUNTER
A refill request was received for:  Requested Prescriptions     Pending Prescriptions Disp Refills    sertraline 50 MG Oral Tab 90 tablet 3     Sig: Take 1 tablet (50 mg total) by mouth daily.       Last refill date: 01      Last office visit:       Future Appointments   Date Time Provider Department Center   4/7/2025 10:30 AM Teresa Tripp MD [facility]   5/5/2025 11:00 AM Rianna Robins, PT [facility]   5/12/2025  2:15 PM Rianna Robins, PT [facility]   5/19/2025 12:30 PM Rianna Robins, PT [facility]   5/27/2025  7:45 AM Rianna Robins, PT [facility]   6/2/2025 11:45 AM Rianna Robins, PT [facility]   6/9/2025 12:30 PM Rianna Robins, PT [facility]   6/16/2025 11:45 AM Rianna Robins, PT [facility]   6/23/2025  9:30 AM Rianna Robins, PT [facility]   6/30/2025 11:00 AM Rianna Robins, PT [facility]   7/7/2025 10:15 AM Rianna Robins, PT [facility]   9/8/2025  9:30 AM Nena Salas MD [facility]

## 2025-04-07 ENCOUNTER — OFFICE VISIT (OUTPATIENT)
Facility: CLINIC | Age: 30
End: 2025-04-07
Payer: COMMERCIAL

## 2025-04-07 ENCOUNTER — TELEPHONE (OUTPATIENT)
Facility: CLINIC | Age: 30
End: 2025-04-07

## 2025-04-07 VITALS
HEART RATE: 67 BPM | DIASTOLIC BLOOD PRESSURE: 66 MMHG | HEIGHT: 67 IN | BODY MASS INDEX: 22.6 KG/M2 | WEIGHT: 144 LBS | SYSTOLIC BLOOD PRESSURE: 100 MMHG

## 2025-04-07 DIAGNOSIS — Z12.4 PAPANICOLAOU SMEAR FOR CERVICAL CANCER SCREENING: Primary | ICD-10-CM

## 2025-04-07 DIAGNOSIS — Z30.430 ENCOUNTER FOR INSERTION OF INTRAUTERINE CONTRACEPTIVE DEVICE (IUD): ICD-10-CM

## 2025-04-07 PROCEDURE — 88175 CYTOPATH C/V AUTO FLUID REDO: CPT | Performed by: OBSTETRICS & GYNECOLOGY

## 2025-04-07 NOTE — H&P
IUD Insertion     Pregnancy Results: negative from urine test   Birth control method(s) used: Abstinence  LMP: Postpartum    Consent signed.  Procedure discussed with the patient in detail including indication, risks, benefits, alternatives and complications.    Procedure:  Speculum placed in the vagina.  Betadine wash of vagina and cervix.  Single tooth tenaculum was placed at the 12 o'clock position.  Mirena IUD was placed without difficulty.  Strings cut at 3 cm.  Single tooth tenaculum removed.  Patient tolerated procedure well.      Visit Plan:  IUD surveillance was discussed with the patient.

## 2025-04-09 LAB — LAST PAP RESULT: NORMAL

## 2025-05-04 ENCOUNTER — TELEPHONE (OUTPATIENT)
Dept: PHYSICAL THERAPY | Facility: HOSPITAL | Age: 30
End: 2025-05-04

## 2025-05-05 ENCOUNTER — OFFICE VISIT (OUTPATIENT)
Dept: PHYSICAL THERAPY | Facility: HOSPITAL | Age: 30
End: 2025-05-05
Attending: OBSTETRICS & GYNECOLOGY
Payer: COMMERCIAL

## 2025-05-05 ENCOUNTER — PATIENT MESSAGE (OUTPATIENT)
Dept: FAMILY MEDICINE CLINIC | Facility: CLINIC | Age: 30
End: 2025-05-05

## 2025-05-05 DIAGNOSIS — F41.9 ANXIETY AND DEPRESSION: ICD-10-CM

## 2025-05-05 DIAGNOSIS — N94.10 DYSPAREUNIA, FEMALE: Primary | ICD-10-CM

## 2025-05-05 DIAGNOSIS — F32.A ANXIETY AND DEPRESSION: ICD-10-CM

## 2025-05-05 PROCEDURE — 97140 MANUAL THERAPY 1/> REGIONS: CPT

## 2025-05-05 PROCEDURE — 97112 NEUROMUSCULAR REEDUCATION: CPT

## 2025-05-05 PROCEDURE — 97162 PT EVAL MOD COMPLEX 30 MIN: CPT

## 2025-05-05 NOTE — PROGRESS NOTES
MUSCULOSKELETAL AND PELVIC FLOOR EVALUATION:     Diagnosis:   Dyspareunia, female (N94.10) Patient:  Shea Oro (30 year old, female)        Referring Provider: Zaida Bazan  Today's Date   2025    Precautions:     none Date of Evaluation: 25  Next MD visit: as needed  Date of Surgery: 3 months ago-birth of chil     PATIENT SUMMARY   Summary of chief complaints: Symptoms started after child born 3 months ago. Pelvic heaviness-zonia w abduction/adduction hip. Urine dribbling and min urine leakage with sneezing. Hasn't attempted intercourse. Has an IUD.  History of current condition: Child born 3 months ago. IUD 4 weeks ago-bleeding on/off. Hasn't tried intercourse yet. Has a hemorrhoid since birth of son.   Pain level: current 0 /10, at best 0 /10, at worst 2 /10  Description of symptoms: pelvic heaviness, leakage, dribbling   Occupation: OT-PRN   Leisure activities/Hobbies: pilates 3x/week   Prior level of function: no pelvic floor issues  Current limitations: pelvic heaviness with movement, after walking  Pt goals: get rid of abdominal/PFM heaviness  Pregnant Now: No   OB History    Para Term  AB Living   1 1 1 0 0 1   SAB IAB Ectopic Multiple Live Births   0 0 0 0 1      # Outcome Date GA Lbr Benjy/2nd Weight Sex Type Anes PTL Lv   1 Term 25 38w5d 06:36 / 00:31 6 lb 12.3 oz M NORMAL SPONT Local N ESTHER      Name: Yseenia Oro      Apgar1: 9  Apgar5: 9       PFDI 20    Scores   POPDI 6:    33.33   CRAD 8:    37.5   ISAEL 6:    37.5   Summary:    108.33      Outpatient Therapy Pelvic Health Intake       Question 2025 10:57 AM CDT - Filed by Patient    Do you usually experience pressure in the lower abdomen? Somewhat    Do you usually experience heaviness or dullness in the pelvic area? Somewhat    Do you usually have a bulge or something falling out that you can see or feel in your vaginal area? Not at all    Do you ever have to push on the vagina or around the  rectum to have or complete a bowel movement? Not at all    Do you usually experience a feeling of incomplete bladder emptying? Not at all    Do you ever have to push up on a bulge in the vaginal area with your fingers to start or complete urination? Not at all    Please provide details on the following urinary history / complaints     Frequency of urination: # of times/day 5    Frequency of urination: # of times/night 0    When you have the urge to urinate, how long can you delay before you have to go to the toilet? (# of minutes or hours) 1 hour    Do you have a history of urinary tract infections: No    Do you have a history of urine loss (select all that apply)       How many times a day does leakage occur? 0    If you have leakage, what type(s) of protective device(s) do you use? (Select all that apply) None    On average, how many pads do you use each day? 0    Do you soak the pad fully?       Do you change the pad each time it is wet?       Do you experience any of these symptoms? (Select all that apply) Dribble after you empty your bladder    Do you usually experience frequent urination? Not at all    Do you usually experience urine leakage associated with a feeling of urgency, that is, a strong sensation of needing to go to the bathroom? Not at all    Do you usually experience urine leakage related to coughing, sneezing, or laughing? Somewhat    Do you usually experience small amounts of urine leakage (that is, drops)? Somewhat    Do you usually experience difficulty empyting your bladder? Not at all    Do you usually experience pain or discomfort in the lower abdomen or genital region? Somewhat    Have you ever had any of the following treatment:     Have you ever done exercises to control urine loss? If so, for how long? no    Has your doctor ever prescribed any medication for urine loss? No    Have you had any surgical procedures to treat urine loss? No    Please provide details on the following bowel  history / complaints.     How many bowel movements do you have per day? 1    How many bowel movements do you have per night? 0    Do you experience diarrhea? If yes, how often? no    Do you feel you need to strain too hard to have a bowel movement? Somewhat    Do you feel you have not completely emptied your bowels at the end of a bowel movement? Not at all    Do you usually lose stool beyond your control if your stool is well formed? Not at all    Do you usually lose stool beyond your control if your stool is loose? Not at all    Do you usually lose gas from the rectum beyond your control? Somewhat    Do you usually have pain when you pass your stool? Somewhat    Do you experience strong sense of urgency and have to rush to bathroom for bowel movement? Somewhat    Does part of bowel ever pass through rectum and bulge outside during/after bowel movement? Not at all    Please provide details on your daily fluid/food intake.     On average, what is your daily fluid intake (1 glass=8ounces)? (# of glasses per day) 7    How many are caffeinated? (# of glasses per day) 1    Do you restrict fluids because of incontenence (leakage)? No    Do you include fiber in your diet (fruits, vegetables, bran, etc.)? Yes    Psychosocial information     Do you live alone? No    Which recreational activities do you participate in if any? pilates    Have you had to restrict your activities due to your pelvic health concerns? No    On a scale of 0 (no impairments) to 10 (severe impairments), what are your feelings about your urinary or bowel incontinence or pelvic pain? 2    Do you have pain with intercourse?       Have you had any changes in intimate relationships/sexual function due to your symptoms?        Your personal safety is of utmost importance to us at [facility], please answer the following questions:     Are you being hurt, frightened, demeaned, or taken advantage of by anyone at your home or in your life?  No     Have you recently had thoughts of hurting yourself? No    Have you tried to hurt yourself in the past?  No    Pelvic Organ Prolapse Distress Inventory 6 Score (range: 0 - 100) 33.33    Colorectal-Anal Distress Inventory 8 Score (range: 0 - 100) 37.5    Urinary Distress Inventory 6 Score (range: 0 - 100) 37.5    PFDI Summary Score (range: 0 - 300) 108.33            Sexual Health Status     Sexual intercourse status: not active     Past medical history was reviewed with Shea.  Significant findings include:      Shea  has a past medical history of Anxiety and depression (11/09/2022), Migraine with aura, Mild persistent asthma without complication (HCC) (11/09/2022), Psoriasis (11/09/2022), and Screening for genetic disease carrier status (07/15/2024).  She  has a past surgical history that includes hysteroscopy (04/02/2024); tonsillectomy (2018); LASIK (Bilateral, 2019); and wisdom teeth removed (2020).    ASSESSMENT  Shea presents to physical therapy evaluation with primary c/o Symptoms started after child born 3 months ago. Pelvic heaviness-zonia w abduction/adduction hip. Urine dribbling and min urine leakage with sneezing. Hasn't attempted intercourse. Has an IUD.. The results of the objective tests and measures show decreased strength and coordination PFM, fair posture, decreased hip/core strength and flexibility, PFM tension. Functional deficits include but are not limited to pelvic heaviness with movement, after walking. Signs and symptoms are consistent with diagnosis of Dyspareunia, female (N94.10). Pt and PT discussed evaluation findings, pathology, POC and HEP.  Pt voiced understanding and performs HEP correctly without reported pain. Skilled Physical Therapy is medically necessary to address the above impairments and reach functional goals.    OBJECTIVE:    Musculoskeletal  Posture: Posture: swayback, increased lumbar lordosis, rounded shoulders   Pelvic Alignment: symm   External Palpation:  hemorrhoid -small  Scars (location/surgery): none  Abdominal Wall Integrity: Sahrmann L1, rotated pelvis with hip flexion   Diastasis Recti: negative, above umbilicus negative, umbilicus negative         Special Tests: Premier; stool #3,4-min straining, hurts with bowel due to hemorroid  Informed consent for internal pelvic evaluation given:Yes     External Observation:   Voluntary contraction: present   Voluntary relaxation: present   Involuntary contraction: present   Involuntary relaxation: present   Mons pubis: WNL   Labia majora: WNL  Labia minora: WNL   Urethral meatus: WNL   Introitus: WNL   Perineal body: WNL  Anus/External Anal Sphincter: NT    Internal Examination   Scar: no tenderness perineum    Pelvic Floor Muscle strength: (PERF= Power/Endurance/Reps/Fast) MMT:  Power Endurance REPS Fast   1 3 3 3     External Anal Sphincter: nt   Accessory Muscle Use:  hip adductors     Tissue Laxity Test:  Anterior Wall: WNL  Posterior Wall: WNL   Apical: WNL     Eccentric lengthening contraction: wnl   Bearing down Valsalva Maneuver (2-3x): wnl     Internal Palpation:  R side only tension/pain   Superficial Transverse Perineal      Bulbospongiosus minimal restriction; pain   Ischiocavernosus minimal restriction; pain   Deep Transverse Perineal     Compress Urethra/Sphincter     Urethrovaginalis     Pubococcygeus minimal restriction; pain   Iliococcygeus minimal restriction; pain   Coccygeus     Piriformis (palpated externally)      Obturator Internus  minimal restriction; pain   Pelvic Clock       ROM and Strength  (* denotes performed with pain)  Trunk ROM MMT (-/5)     Flex 90%       Ext wnl      R L R L     Side bend             Rotation           ,   Hip   ROM MMT (-/5)    R L R L     Flex (L2)     4- 4     Ext      4- 4     Abd wnl wnkl 4- 4     ER wnl wnl 4+ 4+     IR wnl wnl 5 5        Flexibility:  LE Flexibility R L     Hip Flexor         Hamstrings min restricted min restricted     ITB         Piriformis   min  min     Quads  min  min     Gastroc-soleus         Adductors mod restricted mod restricted       Neurological     Sensory/Reflex:  Vestibule: normal bilaterally   Anal Prospect: not tested   Cough Reflex:       Balance and Functional Mobility  Gait: pt ambulates on level ground with normal mechanics.         Today's Treatment and Response:   Pt education was provided on exam findings, treatment diagnosis, treatment plan, expectations, and prognosis.  Today's Treatment       5/5/2025   Pelvic Treatment   Neuro Re-Education Anjelica/phys pelvic floor muscles /diaphragm /Transverse abdominis /fascially connected mm education     Urine/bowel voiding    Pressure management/Canister analogy    Bladder/bowel diary   Manual Therapy internal retraining pelvic floor muscles to learn to perform kegel-phasic,  tonic, diaphragmatic breathing , abdominal bracing     Neuro Re-Educ Minutes 15   Manual Therapy Minutes 10   Evaluation Minutes 20   Total Time Of Timed Procedures 25   Total Time Of Service-Based Procedures 20   Total Treatment Time 45   HEP Kegel 10 repetitions 3x/day, 10 sec on/10 sec off, 2 sec on 2-4 sec off         Patient was instructed in and issued a HEP for: Kegel 10 repetitions 3x/day, 10 sec on/10 sec off, 2 sec on 2-4 sec off     Charges:  PT EVAL: Moderate Complexity, NM TE  In agreement with evaluation findings and clinical rationale, this evaluation involved MODERATE COMPLEXITY decision making due to 1-2 personal factors/comorbidities, 3 or more body structures involved/activity limitations, and evolving symptoms as documented in the evaluation.                                                                       PLAN OF CARE:    Goals: (to be met in 10 visits)    Not Met Progress  Toward Partially Met Met   Patient will improve PERF score to at least 2/6/6//6 for improved pelvic floor function and pelvic organ support. [] [] [] []   Patient will demonstrate ability to coordinate a PFM contraction with 10  sit to stands without CELESTINO symptoms for return to performing functional transfers without leakage. [] [] [] []   Pt will have increased transverse abdominis muscle strength to 2/5 and hip strength to 5/5 to assist with supporting pelvic floor muscles.        Patient will report use of the KNACK at least 75% of the time to mitigate CELESTINO with increases in IAP and to restore cough reflex. [] [] [] []   Patient will report adherence to HEP for continued exercise benefits following cessation of PT. [] [] [] []      Not Met Progress Toward Partially Met Met   Patient will demonstrate PFM lengthening WNL with coordinated diaphragmatic breathing x 10 reps to alleviate PFM pain and muscle tension. [] [] [] []   Patient will demonstrate normalized tone and minimal pain onset (</= 2/10) with internal assessment for increased tolerance to gynecological exam. [] [] [] []          Frequency / Duration: Patient will be seen 10x/week or a total of 10  visits over a 90 day period. Treatment will include: Manual Therapy; Neuromuscular Re-education; Self-Care Home Management; Therapeutic Activities; Therapeutic Exercise; Home Exercise Program instruction    Education or treatment limitation: None Pt had resolution of R PFM tension/pain after internal releases.  Rehab Potential: good     Patient/Family/Caregiver was advised of these findings, precautions, and treatment options and has agreed to actively participate in planning and for this course of care.    Thank you for your referral. Please co-sign or sign and return this letter via fax as soon as possible to [phone]. If you have any questions, please contact me at Dept: [phone]    Sincerely,  Electronically signed by therapist: Rianna Robins, PT  Physician's certification required: Yes  I certify the need for these services furnished under this plan of treatment and while under my care.    X___________________________________________________  Date____________________    Certification From: 5/5/2025  To:8/3/2025

## 2025-05-06 NOTE — TELEPHONE ENCOUNTER
Shea was a new patient to  3/11/25. Former patient of . I spoke with Vesta at [facility] and was told the last time Sertraline 50mg was picked up was 3/24/25 #90.  She has enough until June 2025 but would like to have a refill on hand at the pharmacy    Will you refill?  Routed to

## 2025-05-07 ENCOUNTER — OFFICE VISIT (OUTPATIENT)
Facility: CLINIC | Age: 30
End: 2025-05-07
Payer: COMMERCIAL

## 2025-05-07 VITALS
DIASTOLIC BLOOD PRESSURE: 80 MMHG | SYSTOLIC BLOOD PRESSURE: 112 MMHG | HEART RATE: 62 BPM | WEIGHT: 145 LBS | BODY MASS INDEX: 22.76 KG/M2 | HEIGHT: 67 IN

## 2025-05-07 DIAGNOSIS — Z30.431 ENCOUNTER FOR ROUTINE CHECKING OF INTRAUTERINE CONTRACEPTIVE DEVICE (IUD): Primary | ICD-10-CM

## 2025-05-07 PROCEDURE — 99212 OFFICE O/P EST SF 10 MIN: CPT

## 2025-05-07 PROCEDURE — 99459 PELVIC EXAMINATION: CPT

## 2025-05-07 PROCEDURE — 3008F BODY MASS INDEX DOCD: CPT

## 2025-05-07 PROCEDURE — 3079F DIAST BP 80-89 MM HG: CPT

## 2025-05-07 PROCEDURE — 3074F SYST BP LT 130 MM HG: CPT

## 2025-05-07 NOTE — PROGRESS NOTES
Shea Oro is a 30 year old female  Patient's last menstrual period was 2024 (exact date).   Chief Complaint   Patient presents with    IUD     Mirena inserted on 25 by ANASTASIA    .    OBSTETRICS HISTORY:  OB History    Para Term  AB Living   1 1 1 0 0 1   SAB IAB Ectopic Multiple Live Births   0 0 0 0 1      # Outcome Date GA Lbr Benjy/2nd Weight Sex Type Anes PTL Lv   1 Term 25 38w5d 06:36 / 00:31 6 lb 12.3 oz (3.07 kg) M NORMAL SPONT Local N ESTHER       GYNE HISTORY:      History   Sexual Activity    Sexual activity: Not Currently    Partners: Male        Hx Prior Abnormal Pap: No  Pap Date: 25  Pap Result Notes: negative        MEDICAL HISTORY:  Past Medical History[1]    SURGICAL HISTORY:  Past Surgical History[2]    SOCIAL HISTORY:  Social History     Socioeconomic History    Marital status:      Spouse name: Not on file    Number of children: 1    Years of education: Not on file    Highest education level: Not on file   Occupational History    Not on file   Tobacco Use    Smoking status: Never     Passive exposure: Never    Smokeless tobacco: Never   Vaping Use    Vaping status: Never Used   Substance and Sexual Activity    Alcohol use: Not Currently    Drug use: Never    Sexual activity: Not Currently     Partners: Male   Other Topics Concern    Not on file   Social History Narrative    Not on file     Social Drivers of Health     Food Insecurity: No Food Insecurity (3/11/2025)    NCSS - Food Insecurity     Worried About Running Out of Food in the Last Year: No     Ran Out of Food in the Last Year: No   Transportation Needs: No Transportation Needs (3/11/2025)    NCSS - Transportation     Lack of Transportation: No   Stress: No Stress Concern Present (2025)    Stress     Feeling of Stress : No   Housing Stability: Not At Risk (3/11/2025)    NCSS - Housing/Utilities     Has Housing: Yes     Worried About Losing Housing: No     Unable to Get  Utilities: No       FAMILY HISTORY:  Family History[3]    MEDICATIONS:  Medications - Current[4]    ALLERGIES:  Allergies[5]      PHYSICAL EXAM:   Pelvic Exam:  External Genitalia: normal appearance, hair distribution, and no lesions  Urethral Meatus:  normal in size, location, without lesions and prolapse  Bladder:  No fullness, masses or tenderness  Vagina:  Normal appearance without lesions, no abnormal discharge  Cervix:  Normal without tenderness on motion. Brown string seen   Uterus: normal in size, contour, position, mobility, without tenderness  Adnexa: normal without masses or tenderness  Perineum: normal  Anus: no hemorroids     Assessment & Plan:  1. Encounter for routine checking of intrauterine contraceptive device (IUD)                   [1]   Past Medical History:   Anxiety and depression    Depression    Migraine with aura    history of migraines pre-dating  pregnancy    Mild persistent asthma without complication (HCC)    Psoriasis    Screening for genetic disease carrier status    [employer] Carrier Screen = NEGATIVE FOR 14 OUT OF 14 DISEASES   [2]   Past Surgical History:  Procedure Laterality Date    Hysteroscopy  2024    Hysteroscopic IUD removal - Dr. Nena De Santiago    Insert intrauterine device  2025    Mirena inserted by ANASTASIA Gonzalez Bilateral 2019    Tonsillectomy  2018    [place] teeth removed     [3]   Family History  Problem Relation Age of Onset    Other ( labors) Mother     High Cholesterol Mother     No Known Problems Sister     No Known Problems Brother     Diabetes Maternal Grandmother     Diabetes Maternal Grandfather     Diabetes Paternal Grandfather    [4]   Current Outpatient Medications:     sertraline 50 MG Oral Tab, Take 1 tablet (50 mg total) by mouth daily., Disp: 90 tablet, Rfl: 0    Fluocinonide 0.05 % External Solution, Apply 1 Application topically 2 (two) times daily. (Patient not taking: Reported on 2025), Disp: 60 mL, Rfl: 0    fluticasone  furoate (ARNUITY ELLIPTA) 100 MCG/ACT Inhalation Aerosol Powder, Breath Activated, Inhale 1 puff into the lungs daily., Disp: 3 each, Rfl: 3    prenatal vitamin with DHA 27-0.8-228 MG Oral Cap, Take 1 capsule by mouth daily., Disp: , Rfl:     albuterol 108 (90 Base) MCG/ACT Inhalation Aero Soln, Inhale 2 puffs into the lungs every 6 (six) hours as needed for Wheezing., Disp: 1 each, Rfl: 5  [5] No Known Allergies

## 2025-05-12 ENCOUNTER — APPOINTMENT (OUTPATIENT)
Dept: PHYSICAL THERAPY | Facility: HOSPITAL | Age: 30
End: 2025-05-12
Attending: OBSTETRICS & GYNECOLOGY
Payer: COMMERCIAL

## 2025-05-19 ENCOUNTER — OFFICE VISIT (OUTPATIENT)
Dept: PHYSICAL THERAPY | Facility: HOSPITAL | Age: 30
End: 2025-05-19
Attending: OBSTETRICS & GYNECOLOGY
Payer: COMMERCIAL

## 2025-05-19 PROCEDURE — 97140 MANUAL THERAPY 1/> REGIONS: CPT

## 2025-05-19 PROCEDURE — 97110 THERAPEUTIC EXERCISES: CPT

## 2025-05-19 PROCEDURE — 97112 NEUROMUSCULAR REEDUCATION: CPT

## 2025-05-19 NOTE — PROGRESS NOTES
Patient: Shea Oro (30 year old, female) Referring Provider:  Insurance:   Diagnosis: Dyspareunia, female (N94.10) Zaida Bazan  [facility]   Date of Surgery: 3 months ago-birth of chil Next MD visit:  N/A   Precautions:    as needed Referral Information:    Date of Evaluation: Req: 8, Auth: 8, Exp: 7/31/2025 05/05/25 POC Auth Visits:           Today's Date   5/19/2025    Subjective  Having some PFM heaviness and some abdominal pain. No intercourse yet-out of town. Noticed some lower R abdominal pain yesterday.       Pain: 2/10     Objective  See tx flow sheet. R lower rectus abdominis/linea alba tender/tight,        Assessment  Resolution RA tightness and pain after manual therapy. Improved coordination of PFM via fascially connected mm to help support organs.    Goals (to be met in 10 visits)      Not Met Progress  Toward Partially Met Met   Patient will improve PERF score to at least 2/6/6//6 for improved pelvic floor function and pelvic organ support. [] [] [] []   Patient will demonstrate ability to coordinate a PFM contraction with 10 sit to stands without CELESTINO symptoms for return to performing functional transfers without leakage. [] [] [] []   Pt will have increased transverse abdominis muscle strength to 2/5 and hip strength to 5/5 to assist with supporting pelvic floor muscles.        Patient will report use of the KNACK at least 75% of the time to mitigate CELESTINO with increases in IAP and to restore cough reflex. [] [] [] []   Patient will report adherence to HEP for continued exercise benefits following cessation of PT. [] [] [] []      Not Met Progress Toward Partially Met Met   Patient will demonstrate PFM lengthening WNL with coordinated diaphragmatic breathing x 10 reps to alleviate PFM pain and muscle tension. [] [] [] []   Patient will demonstrate normalized tone and minimal pain onset (</= 2/10) with internal assessment for increased tolerance to gynecological exam. [] []  [] []              Plan  CPOC    Treatment Last 4 Visits  Treatment Day: 2       5/5/2025 5/19/2025   Pelvic Treatment   Therapeutic Exercise  HSS R/L x1'  Butterfly stretch x1'  Happy baby x1'  4 pt cat/camel x10  Child's pose x1'  Piriformis stretch 1' R/L  Hip flexor stretch w OP x1'R/L       Neuro Re-Education Anjelica/phys pelvic floor muscles /diaphragm /Transverse abdominis /fascially connected mm education     Urine/bowel voiding    Pressure management/Canister analogy    Bladder/bowel diary Anjelica/phys pelvic floor muscles /diaphragm /Transverse abdominis /fascially connected mm education     Retraining:  diaphragmatic breathing + hold>PFM lengthening  abdominal bracing   Kegel    Pressure management strategies      Manual Therapy internal retraining pelvic floor muscles to learn to perform kegel-phasic,  tonic, diaphragmatic breathing , abdominal bracing   STM RA    Therapeutic Exercise Minutes  15   Neuro Re-Educ Minutes 15 15   Manual Therapy Minutes 10 15   Evaluation Minutes 20    Total Time Of Timed Procedures 25 45   Total Time Of Service-Based Procedures 20 0   Total Treatment Time 45 45   HEP Kegel 10 repetitions 3x/day, 10 sec on/10 sec off, 2 sec on 2-4 sec off          HEP  Kegel 10 repetitions 3x/day, 10 sec on/10 sec off, 2 sec on 2-4 sec off     Charges  NM  TE  MT

## 2025-05-27 ENCOUNTER — OFFICE VISIT (OUTPATIENT)
Dept: PHYSICAL THERAPY | Facility: HOSPITAL | Age: 30
End: 2025-05-27
Attending: OBSTETRICS & GYNECOLOGY
Payer: COMMERCIAL

## 2025-05-27 PROCEDURE — 97140 MANUAL THERAPY 1/> REGIONS: CPT

## 2025-05-27 PROCEDURE — 97032 APPL MODALITY 1+ESTIM EA 15: CPT

## 2025-05-27 PROCEDURE — 97110 THERAPEUTIC EXERCISES: CPT

## 2025-05-27 NOTE — PROGRESS NOTES
Patient: Shea Oro (30 year old, female) Referring Provider:  Insurance:   Diagnosis: Dyspareunia, female (N94.10) [facility]   Date of Surgery: 3 months ago-birth of chil Next MD visit:  N/A   Precautions:    as needed Referral Information:    Date of Evaluation: Req: 8, Auth: 8, Exp: 7/31/2025 05/05/25 POC Auth Visits:           Today's Date   5/27/2025    Subjective  Has some R lower abdomninal pain with resisted hip adduction. having difficulty with doing plank position. Having a little bit less pelvic heaviness. No intercourse. Some times has L lower abominal pain.       Pain: 2/10     Objective  See tx flow sheet. R//L lower rectus abdominis/linea alba tender/tight,            Assessment  Resolution RA tightness and pain after manual therapy. Improved coordination of PFM via fascially connected mm to help support organs. Progressed lumbopelvic stabilization exs  which were moderately difficult in standing es when standing on L foot.    Goals (to be met in 10 visits)      Not Met Progress  Toward Partially Met Met   Patient will improve PERF score to at least 2/6/6//6 for improved pelvic floor function and pelvic organ support. [] [] [] []   Patient will demonstrate ability to coordinate a PFM contraction with 10 sit to stands without CELESTINO symptoms for return to performing functional transfers without leakage. [] [] [] []   Pt will have increased transverse abdominis muscle strength to 2/5 and hip strength to 5/5 to assist with supporting pelvic floor muscles.        Patient will report use of the KNACK at least 75% of the time to mitigate CELESTINO with increases in IAP and to restore cough reflex. [] [] [] []   Patient will report adherence to HEP for continued exercise benefits following cessation of PT. [] [] [] []      Not Met Progress Toward Partially Met Met   Patient will demonstrate PFM lengthening WNL with coordinated diaphragmatic breathing x 10 reps to alleviate  PFM pain and muscle tension. [] [] [] []   Patient will demonstrate normalized tone and minimal pain onset (</= 2/10) with internal assessment for increased tolerance to gynecological exam. [] [] [] []                  Plan  CPOC with focus on lumbopelvic and abdominal rehab    Treatment Last 4 Visits  Treatment Day: 3       5/5/2025 5/19/2025 5/27/2025   Pelvic Treatment   Therapeutic Exercise  HSS R/L x1'  Butterfly stretch x1'  Happy baby x1'  4 pt cat/camel x10  Child's pose x1'  Piriformis stretch 1' R/L  Hip flexor stretch w OP x1'R/L     Hip adduction 5/5 x4 min w e-stem  Abdominal bracing 5/5 x6 min w e-stim  Kegel + articulating bridge x15w isometric adduction   Butterfly stretch x1'  Cat/camel x10  Bird dog x10 3 sec hold  Child's pose x1'  Dynamic Happy baby x1'  HSS x1' R/L  SLS reach FW to cone x10 R/L         Neuro Re-Education Anjelica/phys pelvic floor muscles /diaphragm /Transverse abdominis /fascially connected mm education     Urine/bowel voiding    Pressure management/Canister analogy    Bladder/bowel diary Anjelica/phys pelvic floor muscles /diaphragm /Transverse abdominis /fascially connected mm education     Retraining:  diaphragmatic breathing + hold>PFM lengthening  abdominal bracing   Kegel    Pressure management strategies       Manual Therapy internal retraining pelvic floor muscles to learn to perform kegel-phasic,  tonic, diaphragmatic breathing , abdominal bracing   STM RA  STM linea alba/rectus abdominis  STM hip adductor mm  Resisted hip adduction with rectus abdominis releases R/L   Modalities   [place] e stim rectus abdominis 10 min 5/5 + set up   Additional Treatment   MHP during [place] e-stim   Therapeutic Exercise Minutes  15 20   Neuro Re-Educ Minutes 15 15    Manual Therapy Minutes 10 15 10   E-Stim (Attended) Minutes   15   Evaluation Minutes 20     Total Time Of Timed Procedures 25 45 45   Total Time Of Service-Based Procedures 20 0 0   Total Treatment Time 45 45 45   HEP Kegel 10  repetitions 3x/day, 10 sec on/10 sec off, 2 sec on 2-4 sec off           HEP  Kegel 10 repetitions 3x/day, 10 sec on/10 sec off, 2 sec on 2-4 sec off     Charges  TE  MT  ES

## 2025-06-02 ENCOUNTER — APPOINTMENT (OUTPATIENT)
Dept: PHYSICAL THERAPY | Facility: HOSPITAL | Age: 30
End: 2025-06-02
Attending: OBSTETRICS & GYNECOLOGY
Payer: COMMERCIAL

## 2025-06-09 ENCOUNTER — OFFICE VISIT (OUTPATIENT)
Dept: PHYSICAL THERAPY | Facility: HOSPITAL | Age: 30
End: 2025-06-09
Attending: OBSTETRICS & GYNECOLOGY
Payer: COMMERCIAL

## 2025-06-09 PROCEDURE — 97110 THERAPEUTIC EXERCISES: CPT

## 2025-06-09 PROCEDURE — 97140 MANUAL THERAPY 1/> REGIONS: CPT

## 2025-06-09 NOTE — PROGRESS NOTES
Patient: Shea Oro (30 year old, female) Referring Provider:  Insurance:   Diagnosis: [facility]pareunia, [facility] (N94.10) [facility]   Date of Surgery: 3 months ago-birth of chil Next MD visit:  N/A   Precautions:    as needed Referral Information:    Date of Evaluation: Req: 8, Auth: 8, Exp: 7/31/2025 05/05/25 POC Auth Visits:           Today's Date   6/9/2025    Subjective  Has some 1/10 pain lower abdominal pain when working out. No pain with intercourse.       Pain: 1/10     Objective  See tx flow sheet. R//L lower rectus abdominis/linea alba tender/tight            Assessment  Almost full resolution of pain and ful resolution of tightness RA after manual therapy. Added: KT for continued reduction of tightness. Improved coordination of PFM via fascially connected mm to help support organs. Progressed lumbopelvic stabilization exs  which Shea was able to do without c/o pain.    Goals (to be met in 10 visits)      Not Met Progress  Toward Partially Met Met   Patient will improve PERF score to at least 2/6/6//6 for improved pelvic floor function and pelvic organ support. [] [] [] []   Patient will demonstrate ability to coordinate a PFM contraction with 10 sit to stands without CELESTINO symptoms for return to performing functional transfers without leakage. [] [] [] []   Pt will have increased transverse abdominis muscle strength to 2/5 and hip strength to 5/5 to assist with supporting pelvic floor muscles.        Patient will report use of the KNACK at least 75% of the time to mitigate CELESTINO with increases in IAP and to restore cough reflex. [] [] [] []   Patient will report adherence to HEP for continued exercise benefits following cessation of PT. [] [] [] []      Not Met Progress Toward Partially Met Met   Patient will demonstrate PFM lengthening WNL with coordinated diaphragmatic breathing x 10 reps to alleviate PFM pain and muscle tension. [] [] [] []   Patient will  demonstrate normalized tone and minimal pain onset (</= 2/10) with internal assessment for increased tolerance to gynecological exam. [] [] [] []                      Plan  CPOC with focus on lumbopelvic and abdominal rehab    Treatment Last 4 Visits  Treatment Day: 4 5/5/2025 5/19/2025 5/27/2025 6/9/2025   Pelvic Treatment   Therapeutic Exercise  HSS R/L x1'  Butterfly stretch x1'  Happy baby x1'  4 pt cat/camel x10  Child's pose x1'  Piriformis stretch 1' R/L  Hip flexor stretch w OP x1'R/L     Hip adduction 5/5 x4 min w e-stem  Abdominal bracing 5/5 x6 min w e-stim  Kegel + articulating bridge x15w isometric adduction   Butterfly stretch x1'  Cat/camel x10  Bird dog x10 3 sec hold  Child's pose x1'  Dynamic Happy baby x1'  HSS x1' R/L  SLS reach FW to cone x10 R/L       Kegel + articulating bridge x15w isometric adduction   Butterfly stretch x1'  Cat/camel x10  Bird dog x10 3 sec hold  Child's pose x30\"  Dynamic Happy baby x1'  HSS x1' R/L  SLS reach FW to cone x10 R/L  Shuttle- DLP  37# with pelvic brace + iso hip adduction x30 reps  Deadlift 5#, 10# x10 ea w coordinated breathing   Precor-  SS w abdominal bracing and diaphragmatic breathing 15# x15 ea   Elliptical L5 2 min       Neuro Re-Education Anjelica/phys pelvic floor muscles /diaphragm /Transverse abdominis /fascially connected mm education     Urine/bowel voiding    Pressure management/Canister analogy    Bladder/bowel diary Anjelica/phys pelvic floor muscles /diaphragm /Transverse abdominis /fascially connected mm education     Retraining:  diaphragmatic breathing + hold>PFM lengthening  abdominal bracing   Kegel    Pressure management strategies     Posture ed throughout session  KT R RA-instructions for removal given   Manual Therapy internal retraining pelvic floor muscles to learn to perform kegel-phasic,  tonic, diaphragmatic breathing , abdominal bracing   STM RA  STM linea alba/rectus abdominis  STM hip adductor mm  Resisted hip adduction with  rectus abdominis releases R/L STM linea alba/rectus abdominis  STM hip adductor mm  Resisted hip adduction with rectus abdominis releases R/L     Modalities   [place]ian e stim rectus abdominis 10 min 5/5 + set up    Additional Treatment   MHP during [place]ian e-stim    Therapeutic Exercise Minutes  15 20 30   Neuro Re-Educ Minutes 15 15  5   Manual Therapy Minutes 10 15 10 15   E-Stim (Attended) Minutes   15    Evaluation Minutes 20      Total Time Of Timed Procedures 25 45 45 50   Total Time Of Service-Based Procedures 20 0 0 0   Total Treatment Time 45 45 45 50   HEP Kegel 10 repetitions 3x/day, 10 sec on/10 sec off, 2 sec on 2-4 sec off            HEP  Kegel 10 repetitions 3x/day, 10 sec on/10 sec off, 2 sec on 2-4 sec off     Charges  TE2  MT

## 2025-06-16 ENCOUNTER — OFFICE VISIT (OUTPATIENT)
Dept: PHYSICAL THERAPY | Facility: HOSPITAL | Age: 30
End: 2025-06-16
Attending: OBSTETRICS & GYNECOLOGY
Payer: COMMERCIAL

## 2025-06-16 PROCEDURE — 97110 THERAPEUTIC EXERCISES: CPT

## 2025-06-16 PROCEDURE — 97112 NEUROMUSCULAR REEDUCATION: CPT

## 2025-06-16 PROCEDURE — 97140 MANUAL THERAPY 1/> REGIONS: CPT

## 2025-06-16 NOTE — PROGRESS NOTES
Patient: Shea Oro (30 year old, female) Referring Provider:  Insurance:   Diagnosis: D[facility]pareunia, [facility]N94.10) [facility]   Date of Surgery: 3 months ago-birth of chil Next MD visit:  N/A   Precautions:    as needed Referral Information:    Date of Evaluation: Req: 8, Auth: 8, Exp: 7/31/2025 05/05/25 POC Auth Visits:           Today's Date   6/16/2025    Subjective  Had intercourse-pain 3/10, but able to change positioning to reduce pain. Pain primarily with deep penetration. Had a lot pelvic floor heaviness for 1 hour afterward.       Pain: 3/10     Objective  See tx flow sheet. L PFM superficial and deep mm tension/tender      Informed consent for internal pelvic evaluation given:Yes            External Observation:   Voluntary contraction: present       Voluntary relaxation: present         Involuntary contraction: present    Involuntary relaxation: present      Mons pubis: WNL             Labia majora: WNL  Labia minora: WNL           Urethral meatus: WNL      Introitus: WNL      Perineal body: WNL  Anus/External Anal Sphincter: NT     Internal Examination   Scar: no tenderness perineum     Pelvic Floor Muscle strength: (PERF= Power/Endurance/Reps/Fast) MMT:  Power Endurance REPS Fast   1 3 3 3      External Anal Sphincter: nt            Accessory Muscle Use:  hip adductors         Tissue Laxity Test:  Anterior Wall: WNL  Posterior Wall: WNL         Apical: WNL            Eccentric lengthening contraction: wnl   Bearing down Valsalva Maneuver (2-3x): wnl            Internal Palpation:  R side only tension/pain   Superficial Transverse Perineal     Bulbospongiosus minimal restriction; pain   Ischiocavernosus minimal restriction; pain   Deep Transverse Perineal    Compress Urethra/Sphincter    Urethrovaginalis    Pubococcygeus minimal restriction; pain   Iliococcygeus minimal restriction; pain   Coccygeus    Piriformis (palpated externally)     Obturator Internus   minimal restriction; pain   Pelvic Clock             Assessment  Full resolution of pain and tightness of PFM after internal releases.  Pt appears to understand suggestions to assist with painfree PFM during intercourse.    Goals (to be met in 10 visits)      Not Met Progress  Toward Partially Met Met   Patient will improve PERF score to at least 2/6/6//6 for improved pelvic floor function and pelvic organ support. [] [] [] []   Patient will demonstrate ability to coordinate a PFM contraction with 10 sit to stands without CELESTINO symptoms for return to performing functional transfers without leakage. [] [] [] []   Pt will have increased transverse abdominis muscle strength to 2/5 and hip strength to 5/5 to assist with supporting pelvic floor muscles.        Patient will report use of the KNACK at least 75% of the time to mitigate CELESTINO with increases in IAP and to restore cough reflex. [] [] [] []   Patient will report adherence to HEP for continued exercise benefits following cessation of PT. [] [] [] []      Not Met Progress Toward Partially Met Met   Patient will demonstrate PFM lengthening WNL with coordinated diaphragmatic breathing x 10 reps to alleviate PFM pain and muscle tension. [] [] [] []   Patient will demonstrate normalized tone and minimal pain onset (</= 2/10) with internal assessment for increased tolerance to gynecological exam. [] [] [] []                          Plan  F/U PFM tightness    Treatment Last 4 Visits  Treatment Day: 5 5/19/2025 5/27/2025 6/9/2025 6/16/2025   Pelvic Treatment   Therapeutic Exercise HSS R/L x1'  Butterfly stretch x1'  Happy baby x1'  4 pt cat/camel x10  Child's pose x1'  Piriformis stretch 1' R/L  Hip flexor stretch w OP x1'R/L     Hip adduction 5/5 x4 min w e-stem  Abdominal bracing 5/5 x6 min w e-stim  Kegel + articulating bridge x15w isometric adduction   Butterfly stretch x1'  Cat/camel x10  Bird dog x10 3 sec hold  Child's pose x1'  Dynamic Happy baby x1'  HSS x1'  R/L  SLS reach FW to cone x10 R/L       Kegel + articulating bridge x15w isometric adduction   Butterfly stretch x1'  Cat/camel x10  Bird dog x10 3 sec hold  Child's pose x30\"  Dynamic Happy baby x1'  HSS x1' R/L  SLS reach FW to cone x10 R/L  Shuttle- DLP  37# with pelvic brace + iso hip adduction x30 reps  Deadlift 5#, 10# x10 ea w coordinated breathing   Precor-  SS w abdominal bracing and diaphragmatic breathing 15# x15 ea   Elliptical L5 2 min     Sit on ball-pelvic drop x10  Sit on ball-U/LE lift x10  Happy baby x1'  Butterfly stretch x1'  Cat/camel x10  Child's pose x1'  HSS x1'       Neuro Re-Education Anjelica/phys pelvic floor muscles /diaphragm /Transverse abdominis /fascially connected mm education     Retraining:  diaphragmatic breathing + hold>PFM lengthening  abdominal bracing   Kegel    Pressure management strategies     Posture ed throughout session  KT R RA-instructions for removal given Anjelica/phys pelvic floor muscles /diaphragm /Transverse abdominis /fascially connected mm education     [place] suggestions-Dilator, DB, use of lubrication, CP afterwards    PFM retraining relaxation   Manual Therapy STM RA  STM linea alba/rectus abdominis  STM hip adductor mm  Resisted hip adduction with rectus abdominis releases R/L STM linea alba/rectus abdominis  STM hip adductor mm  Resisted hip adduction with rectus abdominis releases R/L   Internal pelvic floor muscle (superficial and deep layers) L releases/stretching with functional manual release, sweeping, ischemic compression techniques L>R with coordinated breathing ,     Modalities  Finnish e stim rectus abdominis 10 min 5/5 + set up     Additional Treatment  MHP during Finnish e-stim     Therapeutic Exercise Minutes 15 20 30 15   Neuro Re-Educ Minutes 15  5 15   Manual Therapy Minutes 15 10 15 15   E-Stim (Attended) Minutes  15     Total Time Of Timed Procedures 45 45 50 45   Total Time Of Service-Based Procedures 0 0 0 0   Total Treatment Time 45 45  50 45        HEP  Kegel 10 repetitions 3x/day, 10 sec on/10 sec off, 2 sec on 2-4 sec off     Charges  NM  TE  MT

## 2025-06-23 ENCOUNTER — OFFICE VISIT (OUTPATIENT)
Dept: PHYSICAL THERAPY | Facility: HOSPITAL | Age: 30
End: 2025-06-23
Attending: OBSTETRICS & GYNECOLOGY
Payer: COMMERCIAL

## 2025-06-23 PROCEDURE — 97140 MANUAL THERAPY 1/> REGIONS: CPT

## 2025-06-23 PROCEDURE — 97112 NEUROMUSCULAR REEDUCATION: CPT

## 2025-06-23 PROCEDURE — 97110 THERAPEUTIC EXERCISES: CPT

## 2025-06-23 NOTE — PROGRESS NOTES
Patient: Shea Oro (30 year old, female) Referring Provider:  Insurance:   Diagnosis: Dyspareunia, female (N94.10) Zaida Bazan  [facility]   Date of Surgery: 3 months ago-birth of chil Next MD visit:  N/A   Precautions:   none as needed Referral Information:    Date of Evaluation: Req: 8, Auth: 8, Exp: 7/31/2025 05/05/25 POC Auth Visits:           Today's Date   6/23/2025    Subjective  Tape really helped 2 txs ago and would like to have it done again. Noticed less tension in PFM after internal manual therapy. No intercourse this past week.       Pain: 0/10     Objective  See tx flow sheet. L>R PFM superficial and deep mm tension/tender, rectus abdominis bulge with LE movement          Assessment  Full resolution of pain and tightness of PFM after internal releases.  Pt appears to understand suggestions to assist with painfree PFM during intercourse. Improved abdominal control with brace prior to LE movement for improved support of PFM.    Goals (to be met in 10 visits)      Not Met Progress  Toward Partially Met Met   Patient will improve PERF score to at least 2/6/6//6 for improved pelvic floor function and pelvic organ support. [] [] [] []   Patient will demonstrate ability to coordinate a PFM contraction with 10 sit to stands without CELESTINO symptoms for return to performing functional transfers without leakage. [] [] [] []   Pt will have increased transverse abdominis muscle strength to 2/5 and hip strength to 5/5 to assist with supporting pelvic floor muscles.        Patient will report use of the KNACK at least 75% of the time to mitigate CELESTINO with increases in IAP and to restore cough reflex. [] [] [] []   Patient will report adherence to HEP for continued exercise benefits following cessation of PT. [] [] [] []      Not Met Progress Toward Partially Met Met   Patient will demonstrate PFM lengthening WNL with coordinated diaphragmatic breathing x 10 reps to alleviate PFM pain and  muscle tension. [] [] [] []   Patient will demonstrate normalized tone and minimal pain onset (</= 2/10) with internal assessment for increased tolerance to gynecological exam. [] [] [] []                              Plan  F/U PFM tightness, core strengthening, hip stretching    Treatment Last 4 Visits  Treatment Day: 6 5/27/2025 6/9/2025 6/16/2025 6/23/2025   Pelvic Treatment   Therapeutic Exercise Hip adduction 5/5 x4 min w e-stem  Abdominal bracing 5/5 x6 min w e-stim  Kegel + articulating bridge x15w isometric adduction   Butterfly stretch x1'  Cat/camel x10  Bird dog x10 3 sec hold  Child's pose x1'  Dynamic Happy baby x1'  HSS x1' R/L  SLS reach FW to cone x10 R/L       Kegel + articulating bridge x15w isometric adduction   Butterfly stretch x1'  Cat/camel x10  Bird dog x10 3 sec hold  Child's pose x30\"  Dynamic Happy baby x1'  HSS x1' R/L  SLS reach FW to cone x10 R/L  Shuttle- DLP  37# with pelvic brace + iso hip adduction x30 reps  Deadlift 5#, 10# x10 ea w coordinated breathing   Precor-  SS w abdominal bracing and diaphragmatic breathing 15# x15 ea   Elliptical L5 2 min     Sit on ball-pelvic drop x10  Sit on ball-U/LE lift x10  Happy baby x1'  Butterfly stretch x1'  Cat/camel x10  Child's pose x1'  HSS x1'     Sit on ball-pelvic drop x10  SS, F/B, pelvic circles cw, ccw x10 ea  Sit on ball-U/LE lift x10  Happy baby x1'-dynamic (x2)  Butterfly stretch x1' (x2)  Cat/camel x10 with coordinated breathing   Bird dog x10  HSS x1' R/L  Child's pose x1'       Neuro Re-Education  Posture ed throughout session  KT R RA-instructions for removal given Anjelica/phys pelvic floor muscles /diaphragm /Transverse abdominis /fascially connected mm education     [place] suggestions-Dilator, DB, use of lubrication, CP afterwards    PFM retraining relaxation Anjelica/phys pelvic floor muscles /diaphragm /Transverse abdominis /fascially connected mm education      [place] suggestions-Dilator, DB, use of lubrication, CP  afterwards-reviewed     PFM retraining relaxation    abdominal bracing + LE bicycle 2x10    KT abdominal mm-2 pieces>NM retraining abdominal bracing + bicycle     Manual Therapy STM linea alba/rectus abdominis  STM hip adductor mm  Resisted hip adduction with rectus abdominis releases R/L STM linea alba/rectus abdominis  STM hip adductor mm  Resisted hip adduction with rectus abdominis releases R/L   Internal pelvic floor muscle (superficial and deep layers) L releases/stretching with functional manual release, sweeping, ischemic compression techniques L>R with coordinated breathing ,   Internal pelvic floor muscle (superficial and deep layers) L>R releases/stretching with functional manual release, sweeping, ischemic compression techniques L>R with coordinated breathing    Modalities [place] e stim rectus abdominis 10 min 5/5 + set up      Additional Treatment MHP during [place] e-stim      Therapeutic Exercise Minutes 20 30 15 15   Neuro Re-Educ Minutes  5 15 15   Manual Therapy Minutes 10 15 15 15   E-Stim (Attended) Minutes 15      Total Time Of Timed Procedures 45 50 45 45   Total Time Of Service-Based Procedures 0 0 0 0   Total Treatment Time 45 50 45 45        HEP  Kegel 10 repetitions 3x/day, 10 sec on/10 sec off, 2 sec on 2-4 sec off     Charges  MT  NM  TE

## 2025-06-26 ENCOUNTER — MED REC SCAN ONLY (OUTPATIENT)
Dept: FAMILY MEDICINE CLINIC | Facility: CLINIC | Age: 30
End: 2025-06-26

## 2025-06-30 ENCOUNTER — OFFICE VISIT (OUTPATIENT)
Dept: PHYSICAL THERAPY | Facility: HOSPITAL | Age: 30
End: 2025-06-30
Attending: OBSTETRICS & GYNECOLOGY
Payer: COMMERCIAL

## 2025-06-30 PROCEDURE — 97112 NEUROMUSCULAR REEDUCATION: CPT

## 2025-06-30 PROCEDURE — 97110 THERAPEUTIC EXERCISES: CPT

## 2025-06-30 PROCEDURE — 97140 MANUAL THERAPY 1/> REGIONS: CPT

## 2025-06-30 NOTE — PROGRESS NOTES
Patient: Shea Oro (30 year old, female) Referring Provider:  Insurance:   Diagnosis: Dyspareunia, female (N94.10) Zaida Bazan  [facility]   Date of Surgery: 3 months ago-birth of chil Next MD visit:  N/A   Precautions:    as needed Referral Information:    Date of Evaluation: Req: 13, Auth: 13, Exp: 8/31/2025 05/05/25 POC Auth Visits:           Today's Date   6/30/2025    Subjective  Difficulty with single leg bridge and some times has lower abdominal pressure /hip adductor pressue with resisted hip adduction. Abdominal muscles still feel weak. Had some PFM weakness with deep insertion during intercourse and had some soreness for 15 minutes afterward. Deep breathing helped to reduce pelvic tension.       Pain:  0/10     Objective  See tx flow sheet. R>L PFM superficial and deep mm tension/tender, rectus abdominis bulge with LE movement          Assessment  Full resolution of pain and tightness of PFM after internal releases.  Pt appears to understand suggestions to assist with painfree PFM during intercourse. Improved abdominal control with brace prior to LE movement for improved support of PFM.    Goals (to be met in 10 visits)      Not Met Progress  Toward Partially Met Met   Patient will improve PERF score to at least 2/6/6//6 for improved pelvic floor function and pelvic organ support. [] [] [] []   Patient will demonstrate ability to coordinate a PFM contraction with 10 sit to stands without CELESTINO symptoms for return to performing functional transfers without leakage. [] [] [] []   Pt will have increased transverse abdominis muscle strength to 2/5 and hip strength to 5/5 to assist with supporting pelvic floor muscles.        Patient will report use of the KNACK at least 75% of the time to mitigate CELESTINO with increases in IAP and to restore cough reflex. [] [] [] []   Patient will report adherence to HEP for continued exercise benefits following cessation of PT. [] [] [] []      Not  Met Progress Toward Partially Met Met   Patient will demonstrate PFM lengthening WNL with coordinated diaphragmatic breathing x 10 reps to alleviate PFM pain and muscle tension. [] [] [] []   Patient will demonstrate normalized tone and minimal pain onset (</= 2/10) with internal assessment for increased tolerance to gynecological exam. [] [] [] []                                  Plan  F/U PFM tightness, core strengthening, hip stretching    Treatment Last 4 Visits  Treatment Day: 7 6/9/2025 6/16/2025 6/23/2025 6/30/2025   Pelvic Treatment   Therapeutic Exercise Kegel + articulating bridge x15w isometric adduction   Butterfly stretch x1'  Cat/camel x10  Bird dog x10 3 sec hold  Child's pose x30\"  Dynamic Happy baby x1'  HSS x1' R/L  SLS reach FW to cone x10 R/L  Shuttle- DLP  37# with pelvic brace + iso hip adduction x30 reps  Deadlift 5#, 10# x10 ea w coordinated breathing   Precor-  SS w abdominal bracing and diaphragmatic breathing 15# x15 ea   Elliptical L5 2 min     Sit on ball-pelvic drop x10  Sit on ball-U/LE lift x10  Happy baby x1'  Butterfly stretch x1'  Cat/camel x10  Child's pose x1'  HSS x1'     Sit on ball-pelvic drop x10  SS, F/B, pelvic circles cw, ccw x10 ea  Sit on ball-U/LE lift x10  Happy baby x1'-dynamic (x2)  Butterfly stretch x1' (x2)  Cat/camel x10 with coordinated breathing   Bird dog x10  HSS x1' R/L  Child's pose x1'     Sit on ball-pelvic drop x10  SS, F/B, pelvic circles cw, ccw x10 ea  Sit on ball-U/LE lift x10  Butterfly x1'  HSS x1' R/L  Pelvic ring-  Kegel +  iso hip add  10 sec x10    Kegel+  Iso hip ER  10 sec x10    Hip adductor stretch-w knee ext + MRE's hip add x10 R/L          Neuro Re-Education Posture ed throughout session  KT R RA-instructions for removal given Anjelica/phys pelvic floor muscles /diaphragm /Transverse abdominis /fascially connected mm education     [place] suggestions-Dilator, DB, use of lubrication, CP afterwards    PFM retraining relaxation  Anjelica/phys pelvic floor muscles /diaphragm /Transverse abdominis /fascially connected mm education      [place] suggestions-Dilator, DB, use of lubrication, CP afterwards-reviewed     PFM retraining relaxation    abdominal bracing + LE bicycle 2x10    KT abdominal mm-2 pieces>NM retraining abdominal bracing + bicycle   Anjelica/phys pelvic floor muscles /diaphragm /Transverse abdominis /fascially connected mm education      [place] suggestions-Dilator, DB, use of lubrication, CP afterwards-reviewed     PFM retraining relaxation     KT abdominal mm-2 pieces>NM retraining abdominal bracing + bicycle     Manual Therapy STM linea alba/rectus abdominis  STM hip adductor mm  Resisted hip adduction with rectus abdominis releases R/L   Internal pelvic floor muscle (superficial and deep layers) L releases/stretching with functional manual release, sweeping, ischemic compression techniques L>R with coordinated breathing ,   Internal pelvic floor muscle (superficial and deep layers) L>R releases/stretching with functional manual release, sweeping, ischemic compression techniques L>R with coordinated breathing  Internal pelvic floor muscle (superficial and deep layers) R>L releases/stretching with functional manual release, sweeping, ischemic compression techniques R>L with coordinated breathing    Therapeutic Exercise Minutes 30 15 15 15   Neuro Re-Educ Minutes 5 15 15 15   Manual Therapy Minutes 15 15 15 15   Total Time Of Timed Procedures 50 45 45 45   Total Time Of Service-Based Procedures 0 0 0 0   Total Treatment Time 50 45 45 45        HEP  Kegel 10 repetitions 3x/day, 10 sec on/10 sec off, 2 sec on 2-4 sec off     Charges  TE  NM  MT

## 2025-07-07 ENCOUNTER — APPOINTMENT (OUTPATIENT)
Dept: PHYSICAL THERAPY | Facility: HOSPITAL | Age: 30
End: 2025-07-07
Attending: OBSTETRICS & GYNECOLOGY

## 2025-07-11 ENCOUNTER — TELEPHONE (OUTPATIENT)
Dept: PHYSICAL THERAPY | Facility: HOSPITAL | Age: 30
End: 2025-07-11

## 2025-07-16 ENCOUNTER — APPOINTMENT (OUTPATIENT)
Dept: PHYSICAL THERAPY | Facility: HOSPITAL | Age: 30
End: 2025-07-16
Attending: OBSTETRICS & GYNECOLOGY

## 2025-08-17 DIAGNOSIS — F41.9 ANXIETY AND DEPRESSION: ICD-10-CM

## 2025-08-17 DIAGNOSIS — F32.A ANXIETY AND DEPRESSION: ICD-10-CM

## (undated) NOTE — LETTER
Shea Oro, :1995    CONSENT FOR PROCEDURE/SEDATION    1. I authorize the performance upon Shea Oro  the following: Intrauterine Device Removal    2. I authorize Dr. Nena Salas MD (and whomever is designated as the doctor’s assistant), to perform the above-mentioned procedures.    3. If any unforeseen conditions arise during this procedure calling for additional  procedures, operations, or medications (including anesthesia and blood transfusion), I further request and authorize the doctor to do whatever he/she deems advisable in my interest.    4. I consent to the taking and reproduction of any photographs in the course of this procedure for professional purposes.    5. I consent to the administration of such sedation as may be considered necessary or advisable by the physician responsible for this service, with the exception of ______________________________________________________    6. I have been informed by my doctor of the nature and purpose of this procedure sedation, possible alternative methods of treatment, risk involved and possible complications.    7. If I have a Do Not Resuscitate (DNR) order in place, the physician and I (or the individual authorized to consent on my behalf) will discuss and agree as to whether the Do Not Resuscitate (DNR) order will remain in effect or will be discontinued during the performance of the procedure and the applicable recovery period. If the Do Not Resuscitate (DNR) order is discontinued and is to be reinstated following the procedure/recovery period, the physician will determine when the applicable recovery period ends for purposes of reinstating the Do Not Resuscitate (DNR) order.    Signature of Patient:_______________________________________________    Signature of person authorized to consent for patient:  _______________________________________________________________    Relationship to patient:  ____________________________________________    Witness: _________________________________________ Date:___________     Physician Signature: _______________________________ Date:___________

## (undated) NOTE — LETTER
ASTHMA ACTION PLAN for Nile Marrow     : 1995     Date: 23  Doctor:  Zaida Ronquillo PA-C  Phone for doctor or clinic: [facility], [address], [address] [facility]  [address] [facility] [address]  [place] [phone]      ACT Score: 23    ACT Goal: 20 or greater    Call your provider if you require your rescue/quick reliever medication more than 2-3 times in a 24 hour period. If you require your rescue inhaler/medication more than 2-3 times weekly, your asthma may not be under proper control and you should seek medical attention. *Quick Relievers are Xopenex and Albuterol*    You can use the colors of a traffic light to help learn about your asthma medicines. Year Round       1. Green - Go! % of Personal Best Peak Flow   Use controller medicine. Breathing is good  No cough or wheeze  Can work and play Medicine How much to take When to take it    Medications       Steroid Inhalants Instructions     fluticasone propionate (FLOVENT HFA) 44 MCG/ACT Inhalation Aerosol    Inhale 2 puffs into the lungs 2 (two) times daily. 2. Yellow - Caution. 50-79% Personal Best Peak Flow  Use reliever medicine to keep an asthma attack from getting bad. Cough  Quick Relievers  Wheezing  Tight Chest  Wake up at night Medicine How much to take When to take it    If symptoms are not improving in 24-48 hrs, call office for further instructions  Medications       Steroid Inhalants Instructions     fluticasone propionate (FLOVENT HFA) 44 MCG/ACT Inhalation Aerosol    Inhale 2 puffs into the lungs 2 (two) times daily. 3. Red - Stop!  Danger! <50% Personal Best Peak Flow  Continue Controller Medications But ADD:   Medicine not helping  Breathing is hard and fast  Nose opens wide  Can't walk  Ribs show  Can't talk well Medicine How much to take When to take it    If your symptoms do not improve in ONE hour -  go to the emergency room or call 911 immediately! If symptoms improve, call office for appointment immediately. Albuterol inhaler 2 puffs every 20 minutes for three treatments       Don't forget:  Rinse mouth after using inhaler  Use spacer for inhaler  Remember to get your Flu vaccine every fall! [x] Asthma Action Plan reviewed with the caregiver and patient, and a copy of the plan was given to the patient/caregiver. [] Asthma Action Plan reviewed with the caregiver and patient on the phone, and copy mailed to patient/caregiver or sent via [email or web address]      Signatures:   Provider  Mariah Davis PA-C Patient  Chuck Chapman Caretaker

## (undated) NOTE — LETTER
Shea Oro, :1995  CONSENT FOR PROCEDURE/SEDATION    1. I authorize the performance upon Shea Oro  the following: Intrauterine Device Removal    2. I authorize DAVID Estrella (and whomever is designated as the doctor’s assistant), to perform the above-mentioned procedures.    3. If any unforeseen conditions arise during this procedure calling for additional  procedures, operations, or medications (including anesthesia and blood transfusion), I further request and authorize the doctor to do whatever he/she deems advisable in my interest.    4. I consent to the taking and reproduction of any photographs in the course of this procedure for professional purposes.    5. I consent to the administration of such sedation as may be considered necessary or advisable by the physician responsible for this service, with the exception of ______________________________________________________    6. I have been informed by my doctor of the nature and purpose of this procedure sedation, possible alternative methods of treatment, risk involved and possible complications.    7. If I have a Do Not Resuscitate (DNR) order in place, the physician and I (or the individual authorized to consent on my behalf) will discuss and agree as to whether the Do Not Resuscitate (DNR) order will remain in effect or will be discontinued during the performance of the procedure and the applicable recovery period. If the Do Not Resuscitate (DNR) order is discontinued and is to be reinstated following the procedure/recovery period, the physician will determine when the applicable recovery period ends for purposes of reinstating the Do Not Resuscitate (DNR) order.    Signature of Patient:_______________________________________________    Signature of person authorized to consent for patient:  _______________________________________________________________    Relationship to patient:  ____________________________________________    Witness: _________________________________________ Date:___________     Physician Signature: _______________________________ Date:___________

## (undated) NOTE — LETTER
Shea Oro, :1995    CONSENT FOR PROCEDURE/SEDATION    1. I authorize the performance upon Shea Oro  the following:  Hysteroscopy    2. I authorize Dr. Nena Salas MD (and whomever is designated as the doctor’s assistant), to perform the above-mentioned procedures.    3. If any unforeseen conditions arise during this procedure calling for additional  procedures, operations, or medications (including anesthesia and blood transfusion), I further request and authorize the doctor to do whatever he/she deems advisable in my interest.    4. I consent to the taking and reproduction of any photographs in the course of this procedure for professional purposes.    5. I consent to the administration of such sedation as may be considered necessary or advisable by the physician responsible for this service, with the exception of ______________________________________________________    6. I have been informed by my doctor of the nature and purpose of this procedure sedation, possible alternative methods of treatment, risk involved and possible complications.    7. If I have a Do Not Resuscitate (DNR) order in place, the physician and I (or the individual authorized to consent on my behalf) will discuss and agree as to whether the Do Not Resuscitate (DNR) order will remain in effect or will be discontinued during the performance of the procedure and the applicable recovery period. If the Do Not Resuscitate (DNR) order is discontinued and is to be reinstated following the procedure/recovery period, the physician will determine when the applicable recovery period ends for purposes of reinstating the Do Not Resuscitate (DNR) order.    Signature of Patient:_______________________________________________    Signature of person authorized to consent for patient:  _______________________________________________________________    Relationship to patient:  ____________________________________________    Witness: _________________________________________ Date:___________     Physician Signature: _______________________________ Date:___________

## (undated) NOTE — LETTER
Shea Oro, :1995    CONSENT FOR PROCEDURE/SEDATION    1. I authorize the performance upon Shea Oro  the following: IUD ( paragard) insertion    2. I authorize Dr. Teresa Tripp MD (and whomever is designated as the doctor’s assistant), to perform the above-mentioned procedures.    3. If any unforeseen conditions arise during this procedure calling for additional  procedures, operations, or medications (including anesthesia and blood transfusion), I further request and authorize the doctor to do whatever he/she deems advisable in my interest.    4. I consent to the taking and reproduction of any photographs in the course of this procedure for professional purposes.    5. I consent to the administration of such sedation as may be considered necessary or advisable by the physician responsible for this service, with the exception of ______________________________________________________    6. I have been informed by my doctor of the nature and purpose of this procedure sedation, possible alternative methods of treatment, risk involved and possible complications.    7. If I have a Do Not Resuscitate (DNR) order in place, the physician and I (or the individual authorized to consent on my behalf) will discuss and agree as to whether the Do Not Resuscitate (DNR) order will remain in effect or will be discontinued during the performance of the procedure and the applicable recovery period. If the Do Not Resuscitate (DNR) order is discontinued and is to be reinstated following the procedure/recovery period, the physician will determine when the applicable recovery period ends for purposes of reinstating the Do Not Resuscitate (DNR) order.    Signature of Patient:_______________________________________________    Signature of person authorized to consent for patient:  _______________________________________________________________    Relationship to patient:  ____________________________________________    Witness: _________________________________________ Date:___________     Physician Signature: _______________________________ Date:___________

## (undated) NOTE — LETTER
Shea Oro, :1995    CONSENT FOR PROCEDURE/SEDATION    1. I authorize the performance upon Shea Oro  the following: IUD (mirena) insertion    2. I authorize Dr. Teresa Tripp MD (and whomever is designated as the doctor’s assistant), to perform the above-mentioned procedures.    3. If any unforeseen conditions arise during this procedure calling for additional  procedures, operations, or medications (including anesthesia and blood transfusion), I further request and authorize the doctor to do whatever he/she deems advisable in my interest.    4. I consent to the taking and reproduction of any photographs in the course of this procedure for professional purposes.    5. I consent to the administration of such sedation as may be considered necessary or advisable by the physician responsible for this service, with the exception of ______________________________________________________    6. I have been informed by my doctor of the nature and purpose of this procedure sedation, possible alternative methods of treatment, risk involved and possible complications.    7. If I have a Do Not Resuscitate (DNR) order in place, the physician and I (or the individual authorized to consent on my behalf) will discuss and agree as to whether the Do Not Resuscitate (DNR) order will remain in effect or will be discontinued during the performance of the procedure and the applicable recovery period. If the Do Not Resuscitate (DNR) order is discontinued and is to be reinstated following the procedure/recovery period, the physician will determine when the applicable recovery period ends for purposes of reinstating the Do Not Resuscitate (DNR) order.    Signature of Patient:_______________________________________________    Signature of person authorized to consent for patient:  _______________________________________________________________    Relationship to patient:  ____________________________________________    Witness: _________________________________________ Date:___________     Physician Signature: _______________________________ Date:___________

## (undated) NOTE — LETTER
[facility] Department of OB/GYN  After Care Instructions for IUD      Bleeding   You may experience irregular bleeding for the fist 3-6 months.  If your bleeding becomes heavier than a normal menstrual cycle, please contact our office.    Pain  You may experience mild menstrual cramping after the IUD insertion that will typically last 24-48hrs.  You may take Ibuprofen, Tylenol or Aleve to relieve the discomfort.  If you experience severe or persistent pain, please contact our office.       Restrictions    You should avoid sexual intercourse or tampon use for 1 day after insertion.  Please use a backup method of contraception for 4-7 days with the Mirena and Angela.    When to contact our office  If you are experiencing discomfort described as worse than menstrual cramps that is not relieved by ibuprofen   Fever of 100.4 or greater  Vaginal bleeding that is saturating 1 pad per hour    Any discomfort or poking sensation during intercourse or other sexual activity      Follow up in 4-6 weeks for IUD check.   If you have additional questions or concerns, please call us at [phone].